# Patient Record
Sex: FEMALE | Race: WHITE | Employment: OTHER | URBAN - METROPOLITAN AREA
[De-identification: names, ages, dates, MRNs, and addresses within clinical notes are randomized per-mention and may not be internally consistent; named-entity substitution may affect disease eponyms.]

---

## 2024-08-27 RX ORDER — HYDROCODONE BITARTRATE AND ACETAMINOPHEN 7.5; 325 MG/1; MG/1
1 TABLET ORAL 3 TIMES DAILY
COMMUNITY
End: 2024-09-13

## 2024-08-27 RX ORDER — FLUTICASONE PROPIONATE 50 MCG
SPRAY, SUSPENSION (ML) NASAL DAILY
COMMUNITY

## 2024-08-27 RX ORDER — TOLTERODINE TARTRATE 2 MG/1
2 TABLET, EXTENDED RELEASE ORAL 2 TIMES DAILY
COMMUNITY

## 2024-08-27 RX ORDER — DOCUSATE SODIUM 100 MG/1
100 CAPSULE, LIQUID FILLED ORAL DAILY
COMMUNITY

## 2024-08-27 RX ORDER — PHENOL 1.4 %
600 AEROSOL, SPRAY (ML) MUCOUS MEMBRANE
COMMUNITY

## 2024-08-27 RX ORDER — ORPHENADRINE CITRATE 100 MG/1
TABLET, EXTENDED RELEASE ORAL DAILY
COMMUNITY

## 2024-08-27 RX ORDER — MIRABEGRON 25 MG/1
25 TABLET, FILM COATED, EXTENDED RELEASE ORAL EVERY MORNING
COMMUNITY

## 2024-08-27 RX ORDER — BUMETANIDE 0.5 MG/1
0.5 TABLET ORAL
COMMUNITY

## 2024-08-27 RX ORDER — VALSARTAN 160 MG/1
160 TABLET ORAL EVERY MORNING
COMMUNITY

## 2024-08-27 RX ORDER — ALLOPURINOL 300 MG/1
300 TABLET ORAL
COMMUNITY

## 2024-08-27 RX ORDER — METOLAZONE 2.5 MG/1
2.5 TABLET ORAL DAILY
COMMUNITY

## 2024-08-27 RX ORDER — POTASSIUM CHLORIDE 750 MG/1
10 TABLET, EXTENDED RELEASE ORAL DAILY
COMMUNITY
End: 2024-09-13

## 2024-08-27 RX ORDER — MAGNESIUM OXIDE 400 MG (241.3 MG MAGNESIUM) TABLET
TABLET
COMMUNITY

## 2024-08-27 RX ORDER — ASCORBIC ACID 500 MG
500 TABLET ORAL EVERY MORNING
COMMUNITY

## 2024-08-27 RX ORDER — UBIDECARENONE 75 MG
CAPSULE ORAL EVERY MORNING
COMMUNITY
End: 2024-09-13

## 2024-08-27 RX ORDER — CHOLECALCIFEROL (VITAMIN D3) 50 MCG
2000 TABLET ORAL EVERY MORNING
COMMUNITY

## 2024-08-27 RX ORDER — TIRZEPATIDE 2.5 MG/.5ML
INJECTION, SOLUTION SUBCUTANEOUS DAILY
COMMUNITY

## 2024-08-27 RX ORDER — MULTIVIT-MIN/IRON FUM/FOLIC AC 7.5 MG-4
1 TABLET ORAL DAILY PRN
COMMUNITY

## 2024-08-27 RX ORDER — ANTIOX #8/OM3/DHA/EPA/LUT/ZEAX 250-2.5 MG
CAPSULE ORAL 2 TIMES DAILY
COMMUNITY
End: 2024-09-13

## 2024-08-27 RX ORDER — FAMOTIDINE 40 MG/1
40 TABLET, FILM COATED ORAL EVERY MORNING
COMMUNITY

## 2024-09-09 ENCOUNTER — ANESTHESIA (OUTPATIENT)
Dept: SURGERY | Facility: HOSPITAL | Age: 69
End: 2024-09-09
Payer: MEDICARE

## 2024-09-09 ENCOUNTER — APPOINTMENT (OUTPATIENT)
Dept: GENERAL RADIOLOGY | Facility: HOSPITAL | Age: 69
End: 2024-09-09
Attending: STUDENT IN AN ORGANIZED HEALTH CARE EDUCATION/TRAINING PROGRAM
Payer: MEDICARE

## 2024-09-09 ENCOUNTER — ANESTHESIA EVENT (OUTPATIENT)
Dept: SURGERY | Facility: HOSPITAL | Age: 69
End: 2024-09-09
Payer: MEDICARE

## 2024-09-09 ENCOUNTER — HOSPITAL ENCOUNTER (INPATIENT)
Facility: HOSPITAL | Age: 69
LOS: 4 days | Discharge: NURSING, REHAB OR LONG-TERM CARE | End: 2024-09-13
Attending: STUDENT IN AN ORGANIZED HEALTH CARE EDUCATION/TRAINING PROGRAM | Admitting: STUDENT IN AN ORGANIZED HEALTH CARE EDUCATION/TRAINING PROGRAM
Payer: MEDICARE

## 2024-09-09 DIAGNOSIS — M16.12 PRIMARY OSTEOARTHRITIS OF LEFT HIP: ICD-10-CM

## 2024-09-09 DIAGNOSIS — Z01.818 PREOP TESTING: Primary | ICD-10-CM

## 2024-09-09 PROBLEM — I10 ESSENTIAL HYPERTENSION: Status: ACTIVE | Noted: 2024-09-09

## 2024-09-09 PROBLEM — M10.9 GOUT: Status: ACTIVE | Noted: 2024-09-09

## 2024-09-09 PROBLEM — G47.33 OSA (OBSTRUCTIVE SLEEP APNEA): Status: ACTIVE | Noted: 2024-09-09

## 2024-09-09 LAB
ANTIBODY SCREEN: NEGATIVE
DEPRECATED RDW RBC AUTO: 43.7 FL (ref 35.1–46.3)
ERYTHROCYTE [DISTWIDTH] IN BLOOD BY AUTOMATED COUNT: 13.3 % (ref 11–15)
GLUCOSE BLDC GLUCOMTR-MCNC: 120 MG/DL (ref 70–99)
GLUCOSE BLDC GLUCOMTR-MCNC: 164 MG/DL (ref 70–99)
GLUCOSE BLDC GLUCOMTR-MCNC: 173 MG/DL (ref 70–99)
HCT VFR BLD AUTO: 35.7 %
HGB BLD-MCNC: 12.1 G/DL
MCH RBC QN AUTO: 30.1 PG (ref 26–34)
MCHC RBC AUTO-ENTMCNC: 33.9 G/DL (ref 31–37)
MCV RBC AUTO: 88.8 FL
PLATELET # BLD AUTO: 133 10(3)UL (ref 150–450)
PLATELETS.RETICULATED NFR BLD AUTO: 6.9 % (ref 0–7)
RBC # BLD AUTO: 4.02 X10(6)UL
RH BLOOD TYPE: POSITIVE
RH BLOOD TYPE: POSITIVE
WBC # BLD AUTO: 7.6 X10(3) UL (ref 4–11)

## 2024-09-09 PROCEDURE — 73501 X-RAY EXAM HIP UNI 1 VIEW: CPT | Performed by: STUDENT IN AN ORGANIZED HEALTH CARE EDUCATION/TRAINING PROGRAM

## 2024-09-09 PROCEDURE — 99223 1ST HOSP IP/OBS HIGH 75: CPT | Performed by: HOSPITALIST

## 2024-09-09 PROCEDURE — 0SRB0JA REPLACEMENT OF LEFT HIP JOINT WITH SYNTHETIC SUBSTITUTE, UNCEMENTED, OPEN APPROACH: ICD-10-PCS | Performed by: STUDENT IN AN ORGANIZED HEALTH CARE EDUCATION/TRAINING PROGRAM

## 2024-09-09 PROCEDURE — 73502 X-RAY EXAM HIP UNI 2-3 VIEWS: CPT | Performed by: STUDENT IN AN ORGANIZED HEALTH CARE EDUCATION/TRAINING PROGRAM

## 2024-09-09 DEVICE — IMPLANTABLE DEVICE
Type: IMPLANTABLE DEVICE | Site: HIP | Status: FUNCTIONAL
Brand: G7® VIVACIT-E®

## 2024-09-09 DEVICE — IMPLANTABLE DEVICE
Type: IMPLANTABLE DEVICE | Site: HIP | Status: FUNCTIONAL
Brand: G7® ACETABULAR SYSTEM

## 2024-09-09 DEVICE — IMPLANTABLE DEVICE: Type: IMPLANTABLE DEVICE | Site: HIP | Status: FUNCTIONAL

## 2024-09-09 DEVICE — IMPLANTABLE DEVICE
Type: IMPLANTABLE DEVICE | Site: HIP | Status: FUNCTIONAL
Brand: WAGNER CONE PROSTHESIS®

## 2024-09-09 DEVICE — BIOLOX® DELTA, CERAMIC FEMORAL HEAD, M, Ø 40/0, TAPER 12/14
Type: IMPLANTABLE DEVICE | Site: HIP | Status: FUNCTIONAL
Brand: BIOLOX® DELTA

## 2024-09-09 RX ORDER — SODIUM CHLORIDE, SODIUM LACTATE, POTASSIUM CHLORIDE, CALCIUM CHLORIDE 600; 310; 30; 20 MG/100ML; MG/100ML; MG/100ML; MG/100ML
INJECTION, SOLUTION INTRAVENOUS CONTINUOUS
Status: DISCONTINUED | OUTPATIENT
Start: 2024-09-09 | End: 2024-09-09 | Stop reason: HOSPADM

## 2024-09-09 RX ORDER — LIDOCAINE HYDROCHLORIDE 10 MG/ML
INJECTION, SOLUTION EPIDURAL; INFILTRATION; INTRACAUDAL; PERINEURAL AS NEEDED
Status: DISCONTINUED | OUTPATIENT
Start: 2024-09-09 | End: 2024-09-09 | Stop reason: SURG

## 2024-09-09 RX ORDER — MORPHINE SULFATE 10 MG/ML
6 INJECTION, SOLUTION INTRAMUSCULAR; INTRAVENOUS EVERY 10 MIN PRN
Status: DISCONTINUED | OUTPATIENT
Start: 2024-09-09 | End: 2024-09-09 | Stop reason: HOSPADM

## 2024-09-09 RX ORDER — DOCUSATE SODIUM 100 MG/1
100 CAPSULE, LIQUID FILLED ORAL DAILY
Status: DISCONTINUED | OUTPATIENT
Start: 2024-09-09 | End: 2024-09-09

## 2024-09-09 RX ORDER — DIPHENHYDRAMINE HYDROCHLORIDE 50 MG/ML
25 INJECTION INTRAMUSCULAR; INTRAVENOUS ONCE AS NEEDED
Status: ACTIVE | OUTPATIENT
Start: 2024-09-09 | End: 2024-09-09

## 2024-09-09 RX ORDER — NALOXONE HYDROCHLORIDE 0.4 MG/ML
80 INJECTION, SOLUTION INTRAMUSCULAR; INTRAVENOUS; SUBCUTANEOUS AS NEEDED
Status: DISCONTINUED | OUTPATIENT
Start: 2024-09-09 | End: 2024-09-09 | Stop reason: HOSPADM

## 2024-09-09 RX ORDER — TRANEXAMIC ACID 10 MG/ML
INJECTION, SOLUTION INTRAVENOUS AS NEEDED
Status: DISCONTINUED | OUTPATIENT
Start: 2024-09-09 | End: 2024-09-09 | Stop reason: SURG

## 2024-09-09 RX ORDER — BISACODYL 10 MG
10 SUPPOSITORY, RECTAL RECTAL
Status: DISCONTINUED | OUTPATIENT
Start: 2024-09-09 | End: 2024-09-13

## 2024-09-09 RX ORDER — POLYETHYLENE GLYCOL 3350 17 G/17G
17 POWDER, FOR SOLUTION ORAL DAILY PRN
Status: DISCONTINUED | OUTPATIENT
Start: 2024-09-09 | End: 2024-09-13

## 2024-09-09 RX ORDER — MORPHINE SULFATE 4 MG/ML
2 INJECTION, SOLUTION INTRAMUSCULAR; INTRAVENOUS EVERY 10 MIN PRN
Status: DISCONTINUED | OUTPATIENT
Start: 2024-09-09 | End: 2024-09-09 | Stop reason: HOSPADM

## 2024-09-09 RX ORDER — NICOTINE POLACRILEX 4 MG
30 LOZENGE BUCCAL
Status: DISCONTINUED | OUTPATIENT
Start: 2024-09-09 | End: 2024-09-09 | Stop reason: HOSPADM

## 2024-09-09 RX ORDER — ROCURONIUM BROMIDE 10 MG/ML
INJECTION, SOLUTION INTRAVENOUS AS NEEDED
Status: DISCONTINUED | OUTPATIENT
Start: 2024-09-09 | End: 2024-09-09 | Stop reason: SURG

## 2024-09-09 RX ORDER — BUMETANIDE 0.5 MG/1
0.5 TABLET ORAL
Status: DISCONTINUED | OUTPATIENT
Start: 2024-09-10 | End: 2024-09-13

## 2024-09-09 RX ORDER — METOCLOPRAMIDE HYDROCHLORIDE 5 MG/ML
10 INJECTION INTRAMUSCULAR; INTRAVENOUS EVERY 8 HOURS PRN
Status: DISCONTINUED | OUTPATIENT
Start: 2024-09-09 | End: 2024-09-09 | Stop reason: HOSPADM

## 2024-09-09 RX ORDER — DEXTROSE MONOHYDRATE 25 G/50ML
50 INJECTION, SOLUTION INTRAVENOUS
Status: DISCONTINUED | OUTPATIENT
Start: 2024-09-09 | End: 2024-09-09 | Stop reason: HOSPADM

## 2024-09-09 RX ORDER — SODIUM CHLORIDE, SODIUM LACTATE, POTASSIUM CHLORIDE, CALCIUM CHLORIDE 600; 310; 30; 20 MG/100ML; MG/100ML; MG/100ML; MG/100ML
INJECTION, SOLUTION INTRAVENOUS CONTINUOUS
Status: DISCONTINUED | OUTPATIENT
Start: 2024-09-09 | End: 2024-09-13

## 2024-09-09 RX ORDER — KETAMINE HYDROCHLORIDE 50 MG/ML
INJECTION, SOLUTION INTRAMUSCULAR; INTRAVENOUS AS NEEDED
Status: DISCONTINUED | OUTPATIENT
Start: 2024-09-09 | End: 2024-09-09 | Stop reason: SURG

## 2024-09-09 RX ORDER — FAMOTIDINE 10 MG/ML
20 INJECTION, SOLUTION INTRAVENOUS ONCE
Status: DISCONTINUED | OUTPATIENT
Start: 2024-09-09 | End: 2024-09-09 | Stop reason: HOSPADM

## 2024-09-09 RX ORDER — DOCUSATE SODIUM 100 MG/1
100 CAPSULE, LIQUID FILLED ORAL 2 TIMES DAILY
Status: DISCONTINUED | OUTPATIENT
Start: 2024-09-09 | End: 2024-09-13

## 2024-09-09 RX ORDER — NICOTINE POLACRILEX 4 MG
15 LOZENGE BUCCAL
Status: DISCONTINUED | OUTPATIENT
Start: 2024-09-09 | End: 2024-09-09 | Stop reason: HOSPADM

## 2024-09-09 RX ORDER — ALLOPURINOL 300 MG/1
300 TABLET ORAL
Status: DISCONTINUED | OUTPATIENT
Start: 2024-09-10 | End: 2024-09-13

## 2024-09-09 RX ORDER — ONDANSETRON 2 MG/ML
4 INJECTION INTRAMUSCULAR; INTRAVENOUS EVERY 6 HOURS PRN
Status: DISCONTINUED | OUTPATIENT
Start: 2024-09-09 | End: 2024-09-09 | Stop reason: HOSPADM

## 2024-09-09 RX ORDER — OXYCODONE HYDROCHLORIDE 5 MG/1
5 TABLET ORAL EVERY 4 HOURS PRN
Status: DISCONTINUED | OUTPATIENT
Start: 2024-09-09 | End: 2024-09-13

## 2024-09-09 RX ORDER — ONDANSETRON 2 MG/ML
4 INJECTION INTRAMUSCULAR; INTRAVENOUS EVERY 6 HOURS PRN
Status: DISCONTINUED | OUTPATIENT
Start: 2024-09-09 | End: 2024-09-13

## 2024-09-09 RX ORDER — DIPHENHYDRAMINE HYDROCHLORIDE 50 MG/ML
12.5 INJECTION INTRAMUSCULAR; INTRAVENOUS EVERY 4 HOURS PRN
Status: DISCONTINUED | OUTPATIENT
Start: 2024-09-09 | End: 2024-09-13

## 2024-09-09 RX ORDER — FLUTICASONE PROPIONATE 50 MCG
1 SPRAY, SUSPENSION (ML) NASAL DAILY
Status: DISCONTINUED | OUTPATIENT
Start: 2024-09-09 | End: 2024-09-13

## 2024-09-09 RX ORDER — FAMOTIDINE 20 MG/1
40 TABLET, FILM COATED ORAL EVERY MORNING
Status: DISCONTINUED | OUTPATIENT
Start: 2024-09-10 | End: 2024-09-09

## 2024-09-09 RX ORDER — ASPIRIN 81 MG/1
81 TABLET ORAL 2 TIMES DAILY
Status: DISCONTINUED | OUTPATIENT
Start: 2024-09-09 | End: 2024-09-13

## 2024-09-09 RX ORDER — SENNOSIDES 8.6 MG
17.2 TABLET ORAL NIGHTLY
Status: DISCONTINUED | OUTPATIENT
Start: 2024-09-09 | End: 2024-09-13

## 2024-09-09 RX ORDER — DIPHENHYDRAMINE HCL 25 MG
25 CAPSULE ORAL EVERY 4 HOURS PRN
Status: DISCONTINUED | OUTPATIENT
Start: 2024-09-09 | End: 2024-09-13

## 2024-09-09 RX ORDER — ACETAMINOPHEN 500 MG
1000 TABLET ORAL ONCE
Status: DISCONTINUED | OUTPATIENT
Start: 2024-09-09 | End: 2024-09-09 | Stop reason: HOSPADM

## 2024-09-09 RX ORDER — LOSARTAN POTASSIUM 100 MG/1
100 TABLET ORAL DAILY
Status: DISCONTINUED | OUTPATIENT
Start: 2024-09-10 | End: 2024-09-10

## 2024-09-09 RX ORDER — MIRABEGRON 50 MG/1
50 TABLET, EXTENDED RELEASE ORAL EVERY MORNING
Status: DISCONTINUED | OUTPATIENT
Start: 2024-09-10 | End: 2024-09-10 | Stop reason: SDUPTHER

## 2024-09-09 RX ORDER — ONDANSETRON 2 MG/ML
INJECTION INTRAMUSCULAR; INTRAVENOUS AS NEEDED
Status: DISCONTINUED | OUTPATIENT
Start: 2024-09-09 | End: 2024-09-09 | Stop reason: SURG

## 2024-09-09 RX ORDER — CYCLOBENZAPRINE HCL 10 MG
10 TABLET ORAL 3 TIMES DAILY PRN
Status: DISCONTINUED | OUTPATIENT
Start: 2024-09-09 | End: 2024-09-13

## 2024-09-09 RX ORDER — ACETAMINOPHEN 500 MG
1000 TABLET ORAL ONCE
Status: DISCONTINUED | OUTPATIENT
Start: 2024-09-09 | End: 2024-09-09

## 2024-09-09 RX ORDER — MORPHINE SULFATE 4 MG/ML
4 INJECTION, SOLUTION INTRAMUSCULAR; INTRAVENOUS EVERY 10 MIN PRN
Status: DISCONTINUED | OUTPATIENT
Start: 2024-09-09 | End: 2024-09-09 | Stop reason: HOSPADM

## 2024-09-09 RX ORDER — FAMOTIDINE 10 MG/ML
20 INJECTION, SOLUTION INTRAVENOUS 2 TIMES DAILY
Status: DISCONTINUED | OUTPATIENT
Start: 2024-09-09 | End: 2024-09-13

## 2024-09-09 RX ORDER — METOCLOPRAMIDE HYDROCHLORIDE 5 MG/ML
10 INJECTION INTRAMUSCULAR; INTRAVENOUS EVERY 8 HOURS PRN
Status: DISCONTINUED | OUTPATIENT
Start: 2024-09-09 | End: 2024-09-13

## 2024-09-09 RX ORDER — TRAMADOL HYDROCHLORIDE 50 MG/1
50 TABLET ORAL EVERY 6 HOURS SCHEDULED
Status: DISCONTINUED | OUTPATIENT
Start: 2024-09-10 | End: 2024-09-13

## 2024-09-09 RX ORDER — SODIUM PHOSPHATE, DIBASIC AND SODIUM PHOSPHATE, MONOBASIC 7; 19 G/230ML; G/230ML
1 ENEMA RECTAL ONCE AS NEEDED
Status: DISCONTINUED | OUTPATIENT
Start: 2024-09-09 | End: 2024-09-13

## 2024-09-09 RX ORDER — HYDROMORPHONE HYDROCHLORIDE 1 MG/ML
0.2 INJECTION, SOLUTION INTRAMUSCULAR; INTRAVENOUS; SUBCUTANEOUS EVERY 5 MIN PRN
Status: DISCONTINUED | OUTPATIENT
Start: 2024-09-09 | End: 2024-09-09 | Stop reason: HOSPADM

## 2024-09-09 RX ORDER — HYDROMORPHONE HYDROCHLORIDE 1 MG/ML
0.4 INJECTION, SOLUTION INTRAMUSCULAR; INTRAVENOUS; SUBCUTANEOUS EVERY 5 MIN PRN
Status: DISCONTINUED | OUTPATIENT
Start: 2024-09-09 | End: 2024-09-09 | Stop reason: HOSPADM

## 2024-09-09 RX ORDER — METOCLOPRAMIDE HYDROCHLORIDE 5 MG/ML
10 INJECTION INTRAMUSCULAR; INTRAVENOUS ONCE
Status: DISCONTINUED | OUTPATIENT
Start: 2024-09-09 | End: 2024-09-09 | Stop reason: HOSPADM

## 2024-09-09 RX ORDER — DEXAMETHASONE SODIUM PHOSPHATE 4 MG/ML
VIAL (ML) INJECTION AS NEEDED
Status: DISCONTINUED | OUTPATIENT
Start: 2024-09-09 | End: 2024-09-09 | Stop reason: SURG

## 2024-09-09 RX ORDER — OXYBUTYNIN CHLORIDE 5 MG/1
5 TABLET ORAL 2 TIMES DAILY
Status: DISCONTINUED | OUTPATIENT
Start: 2024-09-09 | End: 2024-09-10

## 2024-09-09 RX ORDER — ACETAMINOPHEN 10 MG/ML
1000 INJECTION, SOLUTION INTRAVENOUS ONCE
Status: COMPLETED | OUTPATIENT
Start: 2024-09-09 | End: 2024-09-09

## 2024-09-09 RX ORDER — HYDROMORPHONE HYDROCHLORIDE 1 MG/ML
0.6 INJECTION, SOLUTION INTRAMUSCULAR; INTRAVENOUS; SUBCUTANEOUS EVERY 5 MIN PRN
Status: DISCONTINUED | OUTPATIENT
Start: 2024-09-09 | End: 2024-09-09 | Stop reason: HOSPADM

## 2024-09-09 RX ORDER — ACETAMINOPHEN 500 MG
1000 TABLET ORAL EVERY 6 HOURS
Status: DISCONTINUED | OUTPATIENT
Start: 2024-09-09 | End: 2024-09-13

## 2024-09-09 RX ORDER — FAMOTIDINE 20 MG/1
20 TABLET, FILM COATED ORAL ONCE
Status: DISCONTINUED | OUTPATIENT
Start: 2024-09-09 | End: 2024-09-09 | Stop reason: HOSPADM

## 2024-09-09 RX ORDER — METOCLOPRAMIDE 10 MG/1
10 TABLET ORAL ONCE
Status: DISCONTINUED | OUTPATIENT
Start: 2024-09-09 | End: 2024-09-09 | Stop reason: HOSPADM

## 2024-09-09 RX ORDER — FAMOTIDINE 20 MG/1
20 TABLET, FILM COATED ORAL 2 TIMES DAILY
Status: DISCONTINUED | OUTPATIENT
Start: 2024-09-09 | End: 2024-09-13

## 2024-09-09 RX ORDER — SODIUM CHLORIDE 9 MG/ML
INJECTION, SOLUTION INTRAVENOUS CONTINUOUS
Status: DISCONTINUED | OUTPATIENT
Start: 2024-09-09 | End: 2024-09-13

## 2024-09-09 RX ADMIN — KETAMINE HYDROCHLORIDE 20 MG: 50 INJECTION, SOLUTION INTRAMUSCULAR; INTRAVENOUS at 14:11:00

## 2024-09-09 RX ADMIN — ROCURONIUM BROMIDE 5 MG: 10 INJECTION, SOLUTION INTRAVENOUS at 14:11:00

## 2024-09-09 RX ADMIN — DEXAMETHASONE SODIUM PHOSPHATE 4 MG: 4 MG/ML VIAL (ML) INJECTION at 14:11:00

## 2024-09-09 RX ADMIN — KETAMINE HYDROCHLORIDE 10 MG: 50 INJECTION, SOLUTION INTRAMUSCULAR; INTRAVENOUS at 16:28:00

## 2024-09-09 RX ADMIN — SODIUM CHLORIDE, SODIUM LACTATE, POTASSIUM CHLORIDE, CALCIUM CHLORIDE: 600; 310; 30; 20 INJECTION, SOLUTION INTRAVENOUS at 16:34:00

## 2024-09-09 RX ADMIN — LIDOCAINE HYDROCHLORIDE 50 MG: 10 INJECTION, SOLUTION EPIDURAL; INFILTRATION; INTRACAUDAL; PERINEURAL at 14:11:00

## 2024-09-09 RX ADMIN — ONDANSETRON 4 MG: 2 INJECTION INTRAMUSCULAR; INTRAVENOUS at 14:11:00

## 2024-09-09 RX ADMIN — KETAMINE HYDROCHLORIDE 10 MG: 50 INJECTION, SOLUTION INTRAMUSCULAR; INTRAVENOUS at 16:00:00

## 2024-09-09 RX ADMIN — TRANEXAMIC ACID 1000 MG: 10 INJECTION, SOLUTION INTRAVENOUS at 14:20:00

## 2024-09-09 RX ADMIN — ROCURONIUM BROMIDE 45 MG: 10 INJECTION, SOLUTION INTRAVENOUS at 14:15:00

## 2024-09-09 RX ADMIN — KETAMINE HYDROCHLORIDE 10 MG: 50 INJECTION, SOLUTION INTRAMUSCULAR; INTRAVENOUS at 15:17:00

## 2024-09-09 NOTE — CONSULTS
[facility]    PATIENT'S NAME: CAROL VARELA   ATTENDING PHYSICIAN: Omar Behery, MD   CONSULTING PHYSICIAN: Arthur Tirado MD   PATIENT ACCOUNT#:   [id number]    LOCATION:  [facility] PACU 10 [facility] 10  MEDICAL RECORD #:   [id number]       YOB: 1955  ADMISSION DATE:       09/09/2024      CONSULT DATE:  09/09/2024    REPORT OF CONSULTATION      REASON FOR ADMISSION:  Post left total hip arthroplasty.    HISTORY OF PRESENT ILLNESS:  Patient is a 69-year-old  female with chronic left hip and underlying severe primary osteoarthritis, failed outpatient conservative medical management options.  Scheduled today for above-mentioned procedure by her orthopedic surgeon, Dr. Omar Behery.  Postoperatively, transferred to PACU for further monitoring.      PAST MEDICAL HISTORY:  Generalized osteoarthritis, gout, hypertension, fibromyalgia, obstructive sleep apnea, obesity, anxiety, gastroesophageal reflux disease, history of DVT.    PAST SURGICAL HISTORY:  D and C procedure, umbilical hernia repair, panniculectomy, right total knee arthroplasty, bilateral carpal tunnel release, hysterectomy, right lower extremity tumor resection, and lumbar spinal fusion per patient's report.    ALLERGIES:  Penicillin and sulfa.  Multiple medication side effects.    SOCIAL HISTORY:  Ex-tobacco user.  No current tobacco, alcohol, or drug use.  Limited mobility.  Usually independent for basic activities of daily living.     FAMILY HISTORY:  Positive for heart disease.    REVIEW OF SYSTEMS:  Currently resting in bed, complaining of lower back pain and left hip pain.  No chest pain.  No shortness of breath.  Other 12-point review of systems is negative.       PHYSICAL EXAMINATION:    GENERAL:  Alert and oriented to time, place and person.  Moderate distress.   VITAL SIGNS:  Temperature 98.3, pulse 92, respiratory rate 10, blood pressure 176/90, pulse ox 98% on 4L nasal cannula oxygen.  HEENT:  Atraumatic.   Oropharynx clear.  Moist mucous membranes.  Ears and nose normal.  Eyes:  Anicteric sclerae.    NECK:  Supple.  No lymphadenopathy.  Trachea midline.  Full range of motion.   LUNGS:  Clear to auscultation bilaterally.  Normal respiratory effort.    HEART:  Regular rate and rhythm.  S1 and S2 auscultated.    ABDOMEN:  Soft, nondistended.  Positive bowel sounds.   EXTREMITIES:  Right posterior hip Prevena wound VAC dressing.  Lymphedema, both legs.  No clubbing or cyanosis.   NEUROLOGIC:  Motor and sensory intact.    ASSESSMENT AND PLAN:    1.   Primary left hip osteoarthritis, status post left total hip arthroplasty, posterior approach.  Pain control.  Neurovascular checks.  Aspirin for DVT prophylaxis.  Physical and occupational therapy.  2.   Essential hypertension.  Continue home medications and monitor.  3.   Bilateral lower extremity lymphedema.  Continue home dose and diuretics.  4.   Obstructive sleep apnea.  Apply obstructive sleep apnea protocol and monitor.  5.   Gout.  Continue home medications.  6.   Chronic back pain.  We will place her on cyclobenzaprine since her home muscle relaxer, orphenadrine, is not on formulary.      Further recommendations to follow.     Dictated By Arthur Tirado MD  d: 09/09/2024 17:48:44  t: 09/09/2024 18:21:00  Job [id number]  FB/    cc: Omar Behery, MD

## 2024-09-09 NOTE — OPERATIVE REPORT
[facility]  OPERATIVE REPORT     DATE OF SURGERY: 9/9/2024     PREOPERATIVE DIAGNOSIS:   Left hip acetabular dysplasia, proximal femoral deformity and degenerative joint disease  Left hip joint ankylosis     POST-OPERATIVE DIAGNOSIS:   Left hip acetabular dysplasia, proximal femoral deformity and degenerative joint disease  Left hip joint ankylosis     PROCEDURE:  Left Complex Primary Total Hip Arthroplasty - Modifier 22  Prophylactic Fixation Femoral shaft (Left)  Application of negative pressure dressing     Location: [facility]     SURGEON: Omar A Behery, MD  Assistant(s): Jp Arreaga MD, Bridget Pardo CSA     Anesthesia type: General  Estimated Blood Loss: 400cc     Drains: Prevena plus incisional WV     Complications: None immediately apparent     Implants:  Acetabular Component: ZB G7 size 60mm shell, Neutral 40mm vitamin E, highly crosslinked liner, 5x 6.5mm screws  Femoral Component: Press-fit size 18, high offset, ZB Maddox cone femoral component  Head: 40mm, 0 delta ceramic head     Indication:      This is a 69 year old man,with adolescent hip deformity, who went on to develop severe hip osteoarthritis in the setting of acetabular dysplasia and proximal femoral deformity with ankylosis of the hip joint, who presented with chronic left hip and back pain refractory to non-operative treatment, and impairing activities of daily living including standing, walking or sitting.  Surgical versus non-surgical options were discussed with the patient, and together we decided it is best to proceed with a total hip arthroplasty with the goals of pain relief and improvement in hip range of motion function. All risks and benefits of surgery including bleeding, infection, recurrent dislocation, ac-prosthetic fracture, neurovascular injury, leg length or offset discrepancy, as well as medical and anesthesia-related complications were discussed with the patient / family, who elected to  proceed with surgery. She understood the high risk nature of her surgery, but felt that her risk was necessary given her extremely poor quality of life currently.     Procedure in detail:     The patient was brought to the operating room and placed in the lateral decubitus position on a lateral  positioner.  The extremities were padded appropriately.  Intravenous antibiotics and TXA were administered.  The operative hip was prepped and draped in the usual sterile fashion.     A surgical pause was conducted to identify and verify the appropriate patient, surgical site, surgical site marking, and antibiotic administration.     A curved incision was then made centered over the lateral aspect of the greater trochanter  and carried sharply down to the level of the fascia.  The fascia was split superiorly and inferiorly. Distally, normal anatomy was identified, to include the vastus ridge and vastus lateralis. The gluteus yamile sling was released for exposure. Proximally, the fascia was split. .  A Charnley hip retractor was placed in the wound.      The posterior column and ischium were exposed and the sciatic nerve was was digitally palpable and was protected the entire case.     An in-situ neck was cut was performed on the femoral neck using a reciprocating saw. The head was removed in a piecemeal fashion and the hip dislocated.      The alvino was also used to lateralize an opening on the femur and find the canal. A round alvino was used to create the appropriate entry point into the proximal femur, and remove any lateral bone that would impede neutral stem alignment. A canal finder was advanced into the canal. The femur was then sequentially reamed and a size 18 cone stem trial was noted to achieve appropriate axial and rotational stability.We planned for a low position of the stem given severe leg shortening preop and anticipated difficulty with reduction. A prophylactic femoral mor wire was placed and tightened  below the lesser prior to insertion of the trial later.      The acetabulum was then exposed with retractors. A 55 reamer was used to ream medially and inferior at the true hip center. The pulvinar was uncovered and resected using cautery. We then sequentially reamed to a  59mm diameter at a slightly higher and medial position to allow for <30% undercoverage of the final cup. The 59mm reamer achieved appropriate hemispherical pinch and there was adequate bleeding cancellous bone for ingrowth  A final 60mm G7 multihole cup was impacted in a position of appropriate anteversion and inclination with excellent press fit. Fluoroscopy confirmed appropriate cup position and 4 dome screws were drilled and placed with 1 ischial screw drilled and placed with excellent purchase. A trial liner was placed at this point to verify ability for reduction.      The trial stem was inserted with a 0 head and reduced with appropriate tension. Intra-op portable plain film was used to confirm appropriate stem position and alignment. The trialed head provided adequate posterior and anterior stability despite the reduced offset noted and at the high hip center position. This was at an increased limb length compared to preop but still shorter than contralateral, as planned. We did not aim to fully restore leg lengths as that would have been impossible to reduce.      The trial stem, head and liner were removed and pain cocktail injection was delivered around the pericapsular tissues. The final 40mm was impacted and checked and noted to be well seated.      The femur was exposed once again and the final stem was impacted into place, along with the final 40mm, 0 head which was reduced. Again, ROM was noted to be stable anteriorly and posteriorly.      Excellent hemostasis was achieved. The wound was lavaged with dilute betadine/peroxide solution, followed by normal saline pulse irrigation. Additional pain cocktail injection was delivered to the  soft tissues.      Hip flexion range of motion of approximately 80 degrees of flexion, 70 degrees of internal rotation without dislocation, and 50 degrees of external rotation was achieved.     The fascia was closed using #1 vicryl interrupted suture and #2 Quill. Subcutaneous tissue was closed using 0-vicryl, 2-0 Vicryl and the skin was closed with 3-0 nylon. A sterile negative pressure dressing was placed.      There was no qualified resident available to assist because qualified residents were assisting with other surgery. The fellow assistant was necessary for help with positioning, draping, retraction, and wound closure.      Modifier 22 was billed for in this case given severe ankylosis, proximal femoral deformity, and acetabular dysplasia, all complex features increasing the amount of time for exposure, instrumentation, and component implantation.      POST-OPERATIVE PLAN  The patient will be permitted WBAT LLE  The patient will be placed on posterior hip precautions for 6 weeks.   The patient will receive 24 hours of perioperative antibiotics followed by 1 week of extended oral prophylaxis  Venous thromboembolic prophylaxis will consist of early mobilization, mechanical compressive devices, and ASA 81 daily.     The patient should be scheduled for follow up in 2 weeks for wound and radiographic evaluation.

## 2024-09-09 NOTE — ANESTHESIA PREPROCEDURE EVALUATION
Anesthesia PreOp Note    HPI:     Maria Elena Moore is a 69 year old female who presents for preoperative consultation requested by: Behery, Omar Atef, MD    Date of Surgery: 9/9/2024    Procedure(s):  Left complex posterior primary total hip arthroplasty  Indication: Left Hip Osteoarthritis    Relevant Problems   No relevant active problems       NPO:  Last Liquid Consumption Date: 09/09/24  Last Liquid Consumption Time: 0730  Last Solid Consumption Date: 09/08/24  Last Solid Consumption Time: 2100  Last Liquid Consumption Date: 09/09/24          History Review:  There are no problems to display for this patient.      Past Medical History:    Diabetes (HCC)    Gout    High blood pressure    Osteoarthritis    PONV (postoperative nausea and vomiting)    Renal disorder    Scoliosis    Spinal stenosis    Visual impairment    glasses       Past Surgical History:   Procedure Laterality Date    Dilation/curettage,diagnostic      1983.1999    Hernia surgery      Hysterectomy  05/2016    Other surgical history      penaculectomy    Other surgical history  2018    tumor removed  right leg    Other surgical history  2012    partial right knee lount    Other surgical history  2002    right total knee replacement    Other surgical history  1999    right wrist carpal tunnel    Other surgical history  2001    Left carpal tunnel       Medications Prior to Admission   Medication Sig Dispense Refill Last Dose    valsartan 160 MG Oral Tab Take 1 tablet (160 mg total) by mouth every morning.   9/8/2024 at 1000    Mirabegron ER (MYRBETRIQ) 50 MG Oral Tablet 24 Hr Take 125 mg by mouth every morning.   9/8/2024 at 1000    bumetanide 0.5 MG Oral Tab Take 1 tablet (0.5 mg total) by mouth BID (Diuretic).   9/8/2024 at 1200    allopurinol 300 MG Oral Tab Take 1 tablet (300 mg total) by mouth twice a week. On Tuesday and thurday 9/5/2024    famotidine 40 MG Oral Tab Take 1 tablet (40 mg total) by mouth every morning.   9/9/2024 at 0940     orphenadrine  MG Oral Tablet 12 Hr Take by mouth daily.   8/21/2024    Coenzyme Q10 (CO Q-10) 200 MG Oral Cap Take by mouth every morning.   9/8/2024 at 1000    cholecalciferol 50 MCG (2000 UT) Oral Tab Take 1 tablet (2,000 Units total) by mouth every morning.   9/8/2024 at 0900    Calcium Carbonate 600 MG Oral Tab Take 1 tablet (600 mg total) by mouth twice a week. On Tuesday/Thursday   Past Week    Vitamin C 500 MG Oral Tab Take 1 tablet (500 mg total) by mouth every morning.   9/8/2024 at 1000    docusate sodium 100 MG Oral Cap Take 1 capsule (100 mg total) by mouth daily.   9/8/2024 at 1000    metOLazone 2.5 MG Oral Tab Take 1 tablet (2.5 mg total) by mouth daily. On Monday and Friday 9/5/2024    Tirzepatide (MOUNJARO) 2.5 MG/0.5ML Subcutaneous Solution Pen-injector Inject into the skin daily. She take it on Wednesday 8/14/2024    ETODOLAC OR Take 50 mg by mouth daily.   8/21/2024    Multiple Vitamins-Minerals (PRESERVISION AREDS 2) Oral Cap Take by mouth 2 (two) times a day.   9/8/2024 at 2100    tolterodine 2 MG Oral Tab Take 1 tablet (2 mg total) by mouth 2 (two) times daily. Take lunch and supper   9/8/2024 at 2100    potassium chloride 10 MEQ Oral Tab CR Take 1 tablet (10 mEq total) by mouth daily. Takes it lunch   9/8/2024 at 1000    HYDROcodone-acetaminophen 7.5-325 MG Oral Tab Take 1 tablet by mouth in the morning, at noon, and at bedtime.   9/9/2024 at 0915    Magnesium Gluconate 500 (27 Mg) MG Oral Tab Take by mouth After dinner.   9/8/2024 at 2110    fluticasone propionate 50 MCG/ACT Nasal Suspension by Each Nare route daily.   9/8/2024 at 1000    Multiple Vitamins-Minerals (MULTI-VITAMIN/MINERALS) Oral Tab Take 1 tablet by mouth daily as needed.   Past Week     Current Facility-Administered Medications Ordered in Epic   Medication Dose Route Frequency Provider Last Rate Last Admin    lactated ringers infusion   Intravenous Continuous Behery, Chan Atef, MD 20 mL/hr at 09/09/24 1128 New Bag  at 09/09/24 1128    acetaminophen (Tylenol Extra Strength) tab 1,000 mg  1,000 mg Oral Once Behery, Omar Atef, MD        famotidine (Pepcid) tab 20 mg  20 mg Oral Once Behery, Omar Atef, MD        Or    famotidine (Pepcid) 20 mg/2mL injection 20 mg  20 mg Intravenous Once Behery, Omar Atef, MD        metoclopramide (Reglan) tab 10 mg  10 mg Oral Once Behery, Omar Atef, MD        Or    metoclopramide (Reglan) 5 mg/mL injection 10 mg  10 mg Intravenous Once Behery, Omar Atef, MD        ceFAZolin (Ancef) 2g in 10mL IV syringe premix  2 g Intravenous Once Behery, Omar Atef, MD        clonidine-EPINEPHrine-ropivacaine-ketorolac (CERTS) (Duraclon-Adrenalin-Naropin-Toradol) pain cocktail irrigation   Intra-articular Once (Intra-Op) Behery, Omar Atef, MD         No current [facility]-ordered outpatient medications on file.       Allergies   Allergen Reactions    Other OTHER (SEE COMMENTS)     IRIDIUM---infection      Penicillins HIVES     Hives,rash as a child  No peeling/blisters  No organ damage    Salicylates PALPITATIONS     headaches    Sulfa Antibiotics HIVES     Hives.rash,itching    Febuxostat OTHER (SEE COMMENTS)     Increase heart rate  headache    Hydromorphone OTHER (SEE COMMENTS)     Headache  Increase heart rate    Nystatin OTHER (SEE COMMENTS)     Hives not always    Valsartan OTHER (SEE COMMENTS)     Headache  Muscle pain    Verapamil OTHER (SEE COMMENTS)     Increase heart rate    Duloxetine OTHER (SEE COMMENTS)     nausea    Gabapentin OTHER (SEE COMMENTS)     Gain weight    Nickel OTHER (SEE COMMENTS)     Cannot confirm--had titanium on her hip    Pregabalin OTHER (SEE COMMENTS)     nausea       Family History   Problem Relation Age of Onset    Heart Disorder Father     Heart Attack Father      Social History     Socioeconomic History    Marital status:    Tobacco Use    Smoking status: Former     Types: Cigarettes    Smokeless tobacco: Never   Vaping Use    Vaping status: Never Used   Substance and  Sexual Activity    Alcohol use: Never    Drug use: Never       Available pre-op labs reviewed.     Lab Results   Component Value Date    PGLU 120 (H) 09/09/2024          Vital Signs:  Body mass index is 36.96 kg/m².   height is 1.702 m (5' 7\") and weight is 107 kg (236 lb). Her oral temperature is 98.3 °F (36.8 °C). Her blood pressure is 128/63 and her pulse is 82. Her respiration is 20 and oxygen saturation is 100%.   Vitals:    08/27/24 1630 09/09/24 1143   BP:  128/63   Pulse:  82   Resp:  20   Temp:  98.3 °F (36.8 °C)   TempSrc:  Oral   SpO2:  100%   Weight: 107.5 kg (237 lb) 107 kg (236 lb)   Height: 1.702 m (5' 7\")         Anesthesia Evaluation     Patient summary reviewed and Nursing notes reviewed    No history of anesthetic complications   Airway   Mallampati: II  Neck ROM: full  Dental      Pulmonary - negative ROS and normal exam   Cardiovascular - negative ROS and normal exam  (+) hypertension    Neuro/Psych - negative ROS     GI/Hepatic/Renal - negative ROS     Endo/Other - negative ROS   (+) diabetes mellitus  Abdominal  - normal exam  (+) obese     Other findings: Unable to lie flat secondary to back spasms   will proceed with general anesthesia  Induction in preop bed            Anesthesia Plan:   ASA:  3  Plan:   General  Airway:  ETT  Post-op Pain Management: IV analgesics  Informed Consent Plan and Risks Discussed With:  Patient  Discussed plan with:  CRNA      I have informed Maria Elena Moore and/or legal guardian or family member of the nature of the anesthetic plan, benefits, risks including possible dental damage if relevant, major complications, and any alternative forms of anesthetic management.   All of the patient's questions were answered to the best of my ability. The patient desires the anesthetic management as planned.  MARILYNN PHAM MD  9/9/2024 1:10 PM  Present on Admission:  **None**

## 2024-09-09 NOTE — H&P
Pre-op Diagnosis: Right Knee Osteoarthritis       A preoperative H&P was conducted By Arjun Hope MD on 8/12/2024.      The above referenced H&P was reviewed by Reji Carmona MD on 9/9/2024, the patient was examined and no significant changes have occurred in the patient's condition since the H&P was performed.  I discussed with the patient and/or legal representative the potential benefits, risks and side effects of this procedure; the likelihood of the patient achieving goals; and potential problems that might occur during recuperation.  I discussed reasonable alternatives to the procedure, including risks, benefits and side effects related to the alternatives and risks related to not receiving this procedure.  We will proceed with procedure as planned.     Reji Carmona MD  Fellow, Adult Joint Replacement and Reconstruction  [facility]  09/09/24, 12:29

## 2024-09-09 NOTE — ANESTHESIA POSTPROCEDURE EVALUATION
Patient: Maria Elena Moore    Procedure Summary       Date: 09/09/24 Room / Location: [facility] MAIN OR  / [facility] MAIN OR    Anesthesia Start: 1359 Anesthesia Stop:     Procedure: Left complex posterior primary total hip arthroplasty (Left: Hip) Diagnosis:       Primary osteoarthritis of left hip      (Left Hip Osteoarthritis)    Surgeons: Behery, Omar Atef, MD Anesthesiologist: Jp Spears MD    Anesthesia Type: general ASA Status: 3            Anesthesia Type: general    Vitals Value Taken Time   /120 09/09/24 1655   Temp 97 09/09/24 1655   Pulse 101 09/09/24 1655   Resp 18 09/09/24 1655   SpO2 100 09/09/24 1655       [facility] AN Post Evaluation:   Patient Evaluated in PACU  Patient Participation: complete - patient participated  Level of Consciousness: sleepy but conscious  Pain Score: 0  Pain Management: adequate  Airway Patency:patent  Dental exam unchanged from preop  Yes    Nausea/Vomiting: none  Cardiovascular Status: acceptable and hemodynamically stable  Respiratory Status: acceptable and nasal cannula  Postoperative Hydration acceptable      Elisa Rodriguez CRNA  9/9/2024 4:55 PM

## 2024-09-09 NOTE — DISCHARGE INSTRUCTIONS
DISCHARGE INSTRUCTIONS:  PLACE ICE TO SURGICAL AREA 20-30 MINUTES ON/ 20-30 MINUTES OFF. THIS IS TO HELP WITH PAIN & SWELLING.    TAKE YOUR MEDICATIONS AS PRESCRIBED BY YOUR DOCTOR BELOW.THESE HAVE BEEN SENT TO YOUR PHARMACY.    IF YOU WERE INSTRUCTED BY PHYSICAL THERAPY TO EXERCISE HIP PRECAUTIONS, CONTINUE TO DO SO AFTER DISCHARGE    IT IS OK TO BEAR WEIGHT AS TOLERATED ON THE OPERATIVE EXTREMITY.     CALL TO CONFIRM YOUR FOLLOW UP APPOINTMENT WITH DR. BEHERY TO BE SEEN IN 2-3 WEEKS POSTOP. [phone]    MEDICATIONS    POST OP MEDICATION REGIMEN              MULTI-MODAL   PAIN REGIMEN       DRUG   FOR   FREQUENCY & DURATION   QTY   NOTES     WEANING  TIPS                Tylenol 500mg     Mild pain   Take 2 tabs every 6 hours     90   Can purchase over-the-counter    Stop using medication if no longer having pain.      Tramadol 50mg     Moderate pain   Take 1-2 tabs every 6 hours only as needed   45 May prescribe a refill, but ideally, this should be tapered off after surgery Stop using this medication 2nd   As pain decreases over time, increase the interval between doses (6 hours to 8, then 12, then 24) to taper off the medication.                    BREAKTHROUGH PAIN         Oxycodone 5mg       Severe pain     Take 1-2 tabs every 4-6 hours only as needed for severe pain       40   Take at least 1 hr apart from Tramadol.    Ideally, no refill. The goal is to taper off this medication as soon as possible, as it can be addictive and have side effects.    Stop using this medication 1st if no longer having severe pain.         BLOOD THINNER     Aspirin 81mg   Blood clot prevention   Take 1 tab twice daily for 30 days     60     No refills   Complete the entire course of your blood thinner.         ANTIBIOTIC       Cefadroxil 500mg       Infection prevention     Take 1 tab twice daily for 7 days     14     No refills   Complete the entire course of your prophylactic antibiotic.           STOOL SOFTENER     Sennokot  8.6/50mg   Constipation   Take 1 tab twice daily  while on opioids     60 Refer to discharge instructions if constipation persists even after taking this medication   Stop taking if having diarrhea or loose stools.           ANTI-NAUSEA   Ondansetron 4mg   Nausea   Take 1 tab every 8 hours as needed if nauseous     20   No Refill      ANTI-ACID REFLUX / GASTRITIS   Pantoprazole 40mg   Acid reflux, gastric ulcer prophylaxis   Take 1 tab every day along with Meloxicam     60   May refill if Meloxicam refilled        DRESSING:    YOU HAVE A SPECIAL DRESSING CALLED A PREVENA DRESSING, OVER YOUR INCISION. THIS IS A TYPE OF NEGATIVE PRESSURE DRESSING THAT KEEPS THE WOUND DRY. IT IS CONNECTED TO A CANNISTER. MAKE SURE THAT THE CANNISTER IS POWERED ON AND NOT OUT OF BATTERY.     THE DRESSING STAYS FOR 7 DAYS FROM SURGERY. THIS DRESSING SHOULD BE CHANGED TO AN AQUACEL AT 7 DAYS POST-OPERATIVELY. IF THE PREVENA DRESSING HAS CONTINUOUS AND PERSISTENT DRAINAGE, CALL YOUR SURGEON FIRST BEFORE CHANGING THE DRESSING TO AN AQUACEL DRESSING.     YOU MAY SHOWER AS SOON AS THE AQUACEL DRESSING IS PLACED INSTEAD OF THE PREVENA DRESSING OVER YOUR INCISION AREA.    IF THE DRESSING LEAKS OR COMES LOOSE BEFORE THE 7 DAY PERIOD CONTACT YOUR DOCTOR / SURGEON.    AFTER   14 DAYS POST OPERATIVELY, THE AQUACEL DRESSING IS REMOVED BY PULLING ON THE EDGE OF THE DRESSING AND GENTLY STRETCHING IT, THEN PEEL IT OFF SLOWLY LIKE A BANDAID. IF YOU HAVE ANY QUESTIONS OR CONCERNS ABOUT THE DRESSING CONTACT YOUR DOCTOR.     REASONS TO CALL YOUR DOCTOR:  TEMPERATURE .0 OR MORE  PERSISTANT NAUSEA OR VOMITTING - ESPECIALLY IF YOU ARE UNABLE TO EAT OR DRINK.  INCREASED LEVEL OF PAIN OR PAIN WHICH IS NOT CONTROLLED BY YOUR PAIN MEDICINE.  PERSISTENT DRAINAGE AT ANYTIME FROM YOUR SURGICAL AREA / INCISION.  PAIN, TENDERNESS OR SUDDEN SWELLING IN YOUR CALF REGION OF THE LOWER EXTREMITIES - THIS IS A POSSIBLE SIGN OF A BLOOD CLOT.  ANY CHANGES IN SENSATION  IN YOUR BODY IN THE AREA OF YOUR SURGERY = NUMBNESS OR TINGLING.  ANY CHANGES IN CIRCULATION IN YOUR BODY IN THE AREA OF YOUR SURGERY = CHANGES  IN COLOR EITHER DARKER OR VERY PALE; OR  IF AREA FEELS COLD TO THE TOUCH AS COMPARED TO OTHER AREAS.  ANY CHANGES IN FUNCTION IN YOUR BODY IN THE AREA OF YOUR SURGERY = CHANGE IN MOVEMENT - UNABLE TO MOVE OR WIGGLE FINGERS, TOES OR FOOT OR ANY OTHER CHANGES IN NORMAL BODY FUNCTIONING.  FOR ANY OF THE ABOVE OR ANY OTHER QUESTIONS OR CONCERNS CALL YOUR DOCTOR/ SURGEON AS SOON AS POSSIBLE.      Omar Behery, MD MPH  Orthopaedic Surgeon, Adult Hip and Knee Reconstruction  [facility] at [address], [address] [address]  Office: (P) [phone] (F) [phone]  Adminstrative Assistant: Sosa Luna     Dear Patient,     [facility] cares about your progress with recovery following your joint replacement surgery.     300 days from your scheduled surgery, [facility] will send you a follow-up survey to help us understand how your surgery impacted your mobility, pain, and overall quality of life. Please make every effort to complete this survey. The information collected from this survey will be used by your physician to track your recovery.     Sincerely,     [facility]

## 2024-09-09 NOTE — ANESTHESIA PROCEDURE NOTES
Airway  Date/Time: 9/9/2024 2:12 PM  Urgency: Elective    Airway not difficult    General Information and Staff    Patient location during procedure: OR  Resident/CRNA: Elisa Rodriguez CRNA  Performed: CRNA   Performed by: Elisa Rodriguez CRNA  Authorized by: Jp Spears MD      Indications and Patient Condition  Indications for airway management: anesthesia  Spontaneous Ventilation: absent  Sedation level: deep  Preoxygenated: yes  Patient position: sniffing  MILS maintained throughout  Mask difficulty assessment: 0 - not attempted  No planned trial extubation    Final Airway Details  Final airway type: endotracheal airway      Successful airway: ETT  Cuffed: yes   Successful intubation technique: Video laryngoscopy  Facilitating devices/methods: intubating stylet  Endotracheal tube insertion site: oral  Blade type: [place].  Blade size: #3  ETT size (mm): 7.5    Cormack-Lehane Classification: grade I - full view of glottis  Placement verified by: capnometry   Cuff volume (mL): 8  Measured from: teeth  ETT to teeth (cm): 20  Number of attempts at approach: 1  Number of other approaches attempted: 0    Additional Comments  Atraumatic intubation

## 2024-09-10 LAB
ANION GAP SERPL CALC-SCNC: 7 MMOL/L (ref 0–18)
BUN BLD-MCNC: 25 MG/DL (ref 9–23)
BUN/CREAT SERPL: 24 (ref 10–20)
CALCIUM BLD-MCNC: 8.6 MG/DL (ref 8.7–10.4)
CHLORIDE SERPL-SCNC: 107 MMOL/L (ref 98–112)
CO2 SERPL-SCNC: 27 MMOL/L (ref 21–32)
CREAT BLD-MCNC: 1.04 MG/DL
DEPRECATED RDW RBC AUTO: 43.3 FL (ref 35.1–46.3)
EGFRCR SERPLBLD CKD-EPI 2021: 59 ML/MIN/1.73M2 (ref 60–?)
ERYTHROCYTE [DISTWIDTH] IN BLOOD BY AUTOMATED COUNT: 13.4 % (ref 11–15)
EST. AVERAGE GLUCOSE BLD GHB EST-MCNC: 126 MG/DL (ref 68–126)
GLUCOSE BLD-MCNC: 143 MG/DL (ref 70–99)
GLUCOSE BLDC GLUCOMTR-MCNC: 106 MG/DL (ref 70–99)
GLUCOSE BLDC GLUCOMTR-MCNC: 115 MG/DL (ref 70–99)
GLUCOSE BLDC GLUCOMTR-MCNC: 122 MG/DL (ref 70–99)
GLUCOSE BLDC GLUCOMTR-MCNC: 135 MG/DL (ref 70–99)
HBA1C MFR BLD: 6 % (ref ?–5.7)
HCT VFR BLD AUTO: 29 %
HGB BLD-MCNC: 10 G/DL
MCH RBC QN AUTO: 30.4 PG (ref 26–34)
MCHC RBC AUTO-ENTMCNC: 34.5 G/DL (ref 31–37)
MCV RBC AUTO: 88.1 FL
OSMOLALITY SERPL CALC.SUM OF ELEC: 299 MOSM/KG (ref 275–295)
PLATELET # BLD AUTO: 131 10(3)UL (ref 150–450)
POTASSIUM SERPL-SCNC: 3.8 MMOL/L (ref 3.5–5.1)
RBC # BLD AUTO: 3.29 X10(6)UL
SODIUM SERPL-SCNC: 141 MMOL/L (ref 136–145)
WBC # BLD AUTO: 7.8 X10(3) UL (ref 4–11)

## 2024-09-10 PROCEDURE — 99233 SBSQ HOSP IP/OBS HIGH 50: CPT | Performed by: HOSPITALIST

## 2024-09-10 RX ORDER — TOLTERODINE 2 MG/1
2 CAPSULE, EXTENDED RELEASE ORAL 2 TIMES DAILY
Status: DISCONTINUED | OUTPATIENT
Start: 2024-09-10 | End: 2024-09-13

## 2024-09-10 RX ORDER — POTASSIUM CHLORIDE 1500 MG/1
40 TABLET, EXTENDED RELEASE ORAL ONCE
Status: COMPLETED | OUTPATIENT
Start: 2024-09-10 | End: 2024-09-10

## 2024-09-10 RX ORDER — MIRABEGRON 25 MG/1
25 TABLET, FILM COATED, EXTENDED RELEASE ORAL DAILY
Status: DISCONTINUED | OUTPATIENT
Start: 2024-09-10 | End: 2024-09-13

## 2024-09-10 RX ORDER — VALSARTAN 160 MG/1
160 TABLET ORAL DAILY
Status: DISCONTINUED | OUTPATIENT
Start: 2024-09-10 | End: 2024-09-13

## 2024-09-10 RX ORDER — METOLAZONE 2.5 MG/1
2.5 TABLET ORAL
Status: DISCONTINUED | OUTPATIENT
Start: 2024-09-13 | End: 2024-09-13

## 2024-09-10 NOTE — CM/SW NOTE
Per review of Dr. Behery notes, HH is to be set up by Case Management team.    CM requested department  ([facility]) to initiate [facility] referral for HHC. F2F entered. SW/ALEXIA will continue to follow.     Billie Flores RN, BSN  Nurse   [phone]

## 2024-09-10 NOTE — OCCUPATIONAL THERAPY NOTE
OCCUPATIONAL THERAPY EVALUATION - INPATIENT     Room Number: 416/416-A  Evaluation Date: 9/10/2024  Type of Evaluation: Initial  Presenting Problem: LT FREDY    Physician Order: IP Consult to Occupational Therapy  Reason for Therapy: ADL/IADL Dysfunction and Discharge Planning    OCCUPATIONAL THERAPY ASSESSMENT   Patient is a 69 year old female admitted 9/9/2024 for LT FREDY, WBAT, posterior THP.  Prior to admission, patient's baseline is mod I using axillary crutches. Pt lives lone in a one story house, retired, drives.   Patient is currently functioning below baseline.  Patient is requiring maximum assist as a result of the following impairments: decreased functional strength, decreased functional reach, decreased endurance, pain, difficulty maintaining precautions, limited MARGA UE (and LT LE d/t posterior THP) ROM, and resistance to therapist suggested techniques . Occupational Therapy will continue to follow for duration of hospitalization.    Patient will benefit from continued skilled OT Services to promote return to prior level of function and safety with continuous assistance and gradual rehabilitative therapy    PLAN  OT Treatment Plan: ADL training;Functional transfer training;Patient/Family education;Patient/Family training;Equipment eval/education;Compensatory technique education     OCCUPATIONAL THERAPY MEDICAL/SOCIAL HISTORY   Problem List   Principal Problem:    Primary osteoarthritis of left hip  Active Problems:    Essential hypertension    DANN (obstructive sleep apnea)    Gout    Preop testing    HOME SITUATION  Type of Home: House  Home Layout: One level  Lives With: Alone  Occupation/Status: does not work  Drives: Yes      Prior Level of [place]: Prior to admission, patient's baseline is mod I using axillary crutches. Pt lives lone in a one story house, retired, drives.       SUBJECTIVE  \"I flipped over the front of the walker a rehab (in the past)\"    OCCUPATIONAL THERAPY EXAMINATION    OBJECTIVE  Precautions: Hip abduction pillow; FREDY - posterior  Fall Risk: High fall risk    WEIGHT BEARING RESTRICTION  L Lower Extremity: Weight Bearing as Tolerated    PAIN ASSESSMENT  Rating: -- (indicates intermittent severe pain)  Location: LT hip  Management Techniques: Repositioning    ACTIVITY TOLERANCE  Limited by pain  No SOB with bed level/bed side activity    COGNITION  Benefits from repeated cues    ACTIVITIES OF DAILY LIVING ASSESSMENT  AM-PAC ‘6-Clicks’ Inpatient Daily Activity Short Form  How much help from another person does the patient currently need…  -   Putting on and taking off regular lower body clothing?: A Lot  -   Bathing (including washing, rinsing, drying)?: A Lot  -   Toileting, which includes using toilet, bedpan or urinal? : A Lot  -   Putting on and taking off regular upper body clothing?: A Little  -   Taking care of personal grooming such as brushing teeth?: A Little  -   Eating meals?: None    AM-PAC Score:  Score: 16  Approx Degree of Impairment: 53.32%  Standardized Score (AM-PAC Scale): 35.96  CMS Modifier (G-Code): CK    FUNCTIONAL TRANSFER ASSESSMENT  Sit to Stand: Edge of Bed  Edge of Bed: -- (Min A x2 to stand with cues for hand and foot placement; mechanical lift xfer from bed to chair)    BED MOBILITY  Rolling: Not Tested  Supine to Sit : -- (Max A x2)  Sit to Supine (OT): Not Tested    BALANCE ASSESSMENT  Provide supervision for unsupported sitting EOB    FUNCTIONAL ADL ASSESSMENT  Provide Max A for LE self care  Provide Total A for toileting    Skilled Therapy Provided: Pt received resting in bed, visitor at bed side left once session began. Pt emotional throughout session, resistant to therapists' suggestions. Pt demo limited ability to transfer information across self care/mobility skills regarding posterior THP. Pt demo self limiting behavior. Pt repeatedly stating she is not able to use R/W and requesting to use axillary crutches. Pt did not demo the skills to  warrant use of crutches. Education provided to pt in regards to recommendations for R/W. Education will be ongoing.   In this session, provide Max A x2 for supine to sit, Min A x2 to stand from bed with max A. Pt was xferred bed to chair via over head lift.     EDUCATION PROVIDED  Patient: Role of Occupational Therapy; Plan of Care; Discharge Recommendations; Functional Transfer Techniques; Surgical Precautions; Posture/Positioning; Compensatory ADL Techniques; Proper Body Mechanics  Patient's Response to Education: Verbalized Understanding (pt resistant to some recommendations)    The patient's Approx Degree of Impairment: 53.32% has been calculated based on documentation in the [facility] '6 clicks' Inpatient Daily Activity Short Form.  Research supports that patients with this level of impairment may benefit from [facility].  Final disposition will be made by interdisciplinary medical team.    Patient End of Session: Up in chair;Needs met;Call light within reach;RN aware of session/findings;All patient questions and concerns addressed    OT Goals  Patient self-stated goal is: indicates goal is to improve mobility     Patient will complete LE dressing with Min A using LHAE  Comment:     Patient will complete toilet transfer with Min A  Comment:       Comment:    Patient will independently recall posterior hip precautions  Comment:         Goals  on:24  Frequency:3-5x/week    Patient Evaluation Complexity Level:   Occupational Profile/Medical History MODERATE - Expanded review of history including review of medical or therapy record   Specific performance deficits impacting engagement in ADL/IADL MODERATE  3 - 5 performance deficits   Client Assessment/Performance Deficits MODERATE - Comorbidities and min to mod modifications of tasks    Clinical Decision Making MODERATE - Analysis of occupational profile, detailed assessments, several treatment options    Overall Complexity MODERATE       Self-Care Home Management:  45 minutes

## 2024-09-10 NOTE — CM/SW NOTE
Anticipated therapy need: Gradual Rehabilitative Therapy    [facility] ref sent Via [facility].     PASRR done.   Pt case sent to [facility] for review.    SW/CM will continue to follow.   Billie Flores RN, BSN  Nurse   [phone]
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
## 2024-09-10 NOTE — PLAN OF CARE
Post-op day #1. Dressing in place to Left Hip. Monitoring vital signs- stable at this time. Remote tele- discontinued. No acute changes noted throughout shift. Receiving IV fluids per MD order. Monitoring blood glucose levels per order. Tolerating diet. Patient is a check void. SCDs and ASA for DVT prophylaxis. Pain medication provided as needed. Up with max assist. Encouraged frequent ambulation and use of incentive spirometer. Fall precautions maintained- bed alarm on, bed locked in lowest position, call light and personal belongings within reach, non-skid socks in place to bilateral feet. Frequent rounding by nursing staff. Plan to discharge to [facility] once medically cleared.      Problem: Patient Centered Care  Goal: Patient preferences are identified and integrated in the patient's plan of care  Description: Interventions:  - What would you like us to know as we care for you?   - Provide timely, complete, and accurate information to patient/family  - Incorporate patient and family knowledge, values, beliefs, and cultural backgrounds into the planning and delivery of care  - Encourage patient/family to participate in care and decision-making at the level they choose  - Honor patient and family perspectives and choices  Outcome: Progressing     Problem: Patient/Family Goals  Goal: Patient/Family Long Term Goal  Description: Patient's Long Term Goal:     Interventions:  - See additional Care Plan goals for specific interventions  Outcome: Progressing  Goal: Patient/Family Short Term Goal  Description: Patient's Short Term Goal:     Interventions:   - See additional Care Plan goals for specific interventions  Outcome: Progressing     Problem: PAIN - ADULT  Goal: Verbalizes/displays adequate comfort level or patient's stated pain goal  Description: INTERVENTIONS:  - Encourage pt to monitor pain and request assistance  - Assess pain using appropriate pain scale  - Administer analgesics based on type and severity of pain  and evaluate response  - Implement non-pharmacological measures as appropriate and evaluate response  - Consider cultural and social influences on pain and pain management  - Manage/alleviate anxiety  - Utilize distraction and/or relaxation techniques  - Monitor for opioid side effects  - Notify MD/LIP if interventions unsuccessful or patient reports new pain  - Anticipate increased pain with activity and pre-medicate as appropriate  Outcome: Progressing     Problem: RISK FOR INFECTION - ADULT  Goal: Absence of fever/infection during anticipated neutropenic period  Description: INTERVENTIONS  - Monitor WBC  - Administer growth factors as ordered  - Implement neutropenic guidelines  Outcome: Progressing     Problem: SAFETY ADULT - FALL  Goal: Free from fall injury  Description: INTERVENTIONS:  - Assess pt frequently for physical needs  - Identify cognitive and physical deficits and behaviors that affect risk of falls.  - [place] fall precautions as indicated by assessment.  - Educate pt/family on patient safety including physical limitations  - Instruct pt to call for assistance with activity based on assessment  - Modify environment to reduce risk of injury  - Provide assistive devices as appropriate  - Consider OT/PT consult to assist with strengthening/mobility  - Encourage toileting schedule  Outcome: Progressing     Problem: DISCHARGE PLANNING  Goal: Discharge to home or other facility with appropriate resources  Description: INTERVENTIONS:  - Identify barriers to discharge w/pt and caregiver  - Include patient/family/discharge partner in discharge planning  - Arrange for needed discharge resources and transportation as appropriate  - Identify discharge learning needs (meds, wound care, etc)  - Arrange for interpreters to assist at discharge as needed  - Consider post-discharge preferences of patient/family/discharge partner  - Complete POLST form as appropriate  - Assess patient's ability to be responsible  for managing their own health  - Refer to Case Management Department for coordinating discharge planning if the patient needs post-hospital services based on physician/LIP order or complex needs related to functional status, cognitive ability or social support system  Outcome: Progressing

## 2024-09-10 NOTE — PROGRESS NOTES
[facility]  part of [facility]    Progress Note    Maria Elena Moore Patient Status:  Inpatient    1955 MRN [id number]   Location [facility] 4W/SW/SE Attending Tamara Stanton MD   Hosp Day # 1 PCP No primary care provider on file.     Chief Complaint: left hip oa    SUBJECTIVE:    No acute events overnight.  Patient denies chest pain, sob.  No fevers/chills.  No n/v/abd pain.  Having expected post op pain.     10 ROS completed and otherwise negative.    OBJECTIVE:  Vital signs in last 24 hours:  /72 (BP Location: Right arm)   Pulse 80   Temp 98.4 °F (36.9 °C) (Oral)   Resp 16   Ht 5' 7\" (1.702 m)   Wt 236 lb (107 kg)   SpO2 99%   BMI 36.96 kg/m²     Intake/Output:    Intake/Output Summary (Last 24 hours) at 9/10/2024 1204  Last data filed at 9/10/2024 1117  Gross per 24 hour   Intake 1780 ml   Output 575 ml   Net 1205 ml       Wt Readings from Last 3 Encounters:   24 236 lb (107 kg)       Exam     Gen: No acute distress  Pulm: Lungs clear, normal respiratory effort  CV: Heart with regular rate and rhythm  Abd: Abdomen soft, nontender, bowel sounds present  Neuro: No acute focal deficits  MSK: moves extremities  Skin: Warm and dry  Psych: Normal affect  Ext: no c/c/e    Data Review:     Labs:   Lab Results   Component Value Date    WBC 7.8 09/10/2024    HGB 10.0 09/10/2024    HCT 29.0 09/10/2024    .0 09/10/2024    CREATSERUM 1.04 09/10/2024    BUN 25 09/10/2024     09/10/2024    K 3.8 09/10/2024     09/10/2024    CO2 27.0 09/10/2024     09/10/2024    CA 8.6 09/10/2024       Imaging: Reviewed    XR HIP W OR WO PELVIS 2 OR 3 VIEWS, LEFT (CPT=73502)    Result Date: 2024  CONCLUSION: Post left hip arthroplasty without complication.    Dictated by (CST): Bakari Moss MD on 2024 at 5:59 PM     Finalized by (CST): Bakari Moss MD on 2024 at 6:08 PM          XR HIP (1 VIEW) UNILATERAL EM    Result Date:  9/9/2024  CONCLUSION:  1. Left total hip arthroplasty with cerclage cable along the proximal femoral component.    Dictated by (CST): Vitaliy Mcdonough MD on 9/09/2024 at 4:49 PM     Finalized by (CST): Vitaliy Mcdonough MD on 9/09/2024 at 4:50 PM           Meds:   Current Facility-Administered Medications   Medication Dose Route Frequency    lactated ringers infusion   Intravenous Continuous    aspirin DR tab 81 mg  81 mg Oral BID    sennosides (Senokot) tab 17.2 mg  17.2 mg Oral Nightly    docusate sodium (Colace) cap 100 mg  100 mg Oral BID    polyethylene glycol (PEG 3350) (Miralax) 17 g oral packet 17 g  17 g Oral Daily PRN    magnesium hydroxide (Milk of Magnesia) 400 MG/5ML oral suspension 30 mL  30 mL Oral Daily PRN    bisacodyl (Dulcolax) 10 MG rectal suppository 10 mg  10 mg Rectal Daily PRN    fleet enema (Fleet) rectal enema 133 mL  1 enema Rectal Once PRN    ondansetron (Zofran) 4 MG/2ML injection 4 mg  4 mg Intravenous Q6H PRN    metoclopramide (Reglan) 5 mg/mL injection 10 mg  10 mg Intravenous Q8H PRN    famotidine (Pepcid) tab 20 mg  20 mg Oral BID    Or    famotidine (Pepcid) 20 mg/2mL injection 20 mg  20 mg Intravenous BID    diphenhydrAMINE (Benadryl) cap/tab 25 mg  25 mg Oral Q4H PRN    Or    diphenhydrAMINE (Benadryl) 50 mg/mL  injection 12.5 mg  12.5 mg Intravenous Q4H PRN    oxyCODONE immediate release tab 5 mg  5 mg Oral Q4H PRN    Or    oxyCODONE immediate release tab 5 mg  5 mg Oral Q4H PRN    sodium chloride 0.9% infusion   Intravenous Continuous    acetaminophen (Tylenol Extra Strength) tab 1,000 mg  1,000 mg Oral q6h    traMADol (Ultram) tab 50 mg  50 mg Oral 4 times per day    allopurinol (Zyloprim) tab 300 mg  300 mg Oral Twice Weekly    bumetanide (Bumex) tab 0.5 mg  0.5 mg Oral BID (Diuretic)    fluticasone propionate (Flonase) 50 MCG/ACT nasal suspension 1 spray  1 spray Each Nare Daily    oxybutynin (Ditropan) tab 5 mg  5 mg Oral BID    losartan (Cozaar) tab 100 mg  100 mg Oral Daily     [Held by provider] Mirabegron ER (Myrbetriq) 50 MG tablet TB24 50 mg(patient own med)  50 mg Oral QAM    cyclobenzaprine (Flexeril) tab 10 mg  10 mg Oral TID PRN         Assessment  Patient Active Problem List   Diagnosis    Primary osteoarthritis of left hip    Essential hypertension    KIEL (obstructive sleep apnea)    Gout    Preop testing       Plan:     Primary left hip oa  - s/p left total hip arthroplasty  - pain control with orals as needed, IV for breakthrough  - Asa for dvt prophy  - cont pt/ot - may need F[facility] although pt is hoping she can go home    HTN  - monitor vitals and adjust meds prn    BL le lymphedema, chronic  - cont home meds    KIEL  - kiel protocol    Gout  - cont home meds    Chronic back pain   - cont cyclobenzaprine as needed     Prophylaxis:   DVT with asa    Dispo: pending clinical course    Global A/P  - reviewed labs and vitals from today  - reviewed notes of the day  - cbc, bmp, mag ordered for tomorrow  - discussed need to stay in the hospital today due to need for continued pt  - cont IV pain meds prn  - discussed with patient/RN and care team        Greater than 55 minutes spent, Greater than 50% spent counseling and in coordination of care, reviewing labs, discussing results with patient, coordinating care with care team and ordering labs for tomorrow and adjusting medications as needed      Tamara Stanton MD  9/10/2024  12:04 PM    **Certification      PHYSICIAN Certification of Need for Inpatient Hospitalization - Initial Certification    Patient will require inpatient services that will reasonably be expected to span two midnight's based on the clinical documentation in H+P.   Based on patients current state of illness, I anticipate that, after discharge, patient will require TBD.

## 2024-09-10 NOTE — PHYSICAL THERAPY NOTE
PHYSICAL THERAPY EVALUATION - INPATIENT     Room Number: 416/416-A  Evaluation Date: 9/10/2024  Type of Evaluation: Initial   Physician Order: PT Eval and Treat    Presenting Problem: S/P L FREDY 9/9/24  Co-Morbidities : OA, HTN, gout, fibromyalgia, R TKA, bilateral carpal tunnel release, RLE tumor resection, lumbar fusion  Reason for Therapy: Mobility Dysfunction and Discharge Planning    PHYSICAL THERAPY ASSESSMENT   Patient is a 69 year old female admitted 9/9/2024 S/P L FREDY.  Prior to admission, patient's baseline is [place] using bilateral crutches, independent with ADLs.  Patient is currently functioning below baseline with bed mobility, transfers, gait, and stair negotiation.  Patient is requiring dependent assistance as a result of the following impairments: decreased functional strength, pain, impaired standing balance, cognitive deficits (impaired problem solving, decreased insight), and limited LLE ROM.  Physical Therapy will continue to follow for duration of hospitalization.    Patient will benefit from continued skilled PT Services to promote return to prior level of function and safety with continuous assistance and gradual rehabilitative therapy .    PLAN  PT Treatment Plan: Bed mobility;Body mechanics;Endurance;Energy conservation;Patient education;Family education;Gait training;Strengthening;Transfer training;Balance training  Rehab Potential : Fair  Frequency (Obs): Daily    PHYSICAL THERAPY MEDICAL/SOCIAL HISTORY     Problem List  Principal Problem:    Primary osteoarthritis of left hip  Active Problems:    Essential hypertension    DANN (obstructive sleep apnea)    Gout    Preop testing      HOME SITUATION  Home Situation  Type of Home: House  Home Layout: One level  Stairs to Enter : 0  Lives With: Alone  Drives: Yes  Patient Owned Equipment: Crutches;Rolling walker     Prior Level of [place]: Yenifer using bilateral crutches, independent with ADLs    SUBJECTIVE  \"I can't use the walker, my back is  hunched I also need back surgery. I have to use the crutches.\"    PHYSICAL THERAPY EXAMINATION   OBJECTIVE  Precautions: FREDY - posterior  Fall Risk: High fall risk    WEIGHT BEARING RESTRICTION  Weight Bearing Restriction: L lower extremity           L Lower Extremity: Weight Bearing as Tolerated    PAIN ASSESSMENT  Ratin  Location: L hip  Management Techniques: Body mechanics;Repositioning;Activity promotion    COGNITION  Overall Cognitive Status:  Impaired  Perseveration: perseverates during conversation  Problem Solving:  assistance required to identify errors made, assistance required to generate solutions, and assistance required to implement solutions    RANGE OF MOTION AND STRENGTH ASSESSMENT  Upper extremity ROM and strength are within functional limits  BUEs to shoulder height, history of bilateral shoulder pain  Lower extremity ROM is within functional limits  RLE, LLE not assessed 2/2 pain/precautions   Lower extremity strength is within functional limits  RLE, LLE not assessed 2/2 pain/precautions     BALANCE  Static Sitting: Fair +  Dynamic Sitting: Fair  Static Standing: Fair -  Dynamic Standing: Poor +    ACTIVITY TOLERANCE  Pulse: 76  Heart Rate Source: Monitor     BP: 122/72  BP Location: Right arm  BP Method: Automatic  Patient Position: Sitting    O2 WALK  Oxygen Therapy  SPO2% on Room Air at Rest: 96    AM-PAC '6-Clicks' INPATIENT SHORT FORM - BASIC MOBILITY  How much difficulty does the patient currently have...  Patient Difficulty: Turning over in bed (including adjusting bedclothes, sheets and blankets)?: Unable   Patient Difficulty: Sitting down on and standing up from a chair with arms (e.g., wheelchair, bedside commode, etc.): A Lot   Patient Difficulty: Moving from lying on back to sitting on the side of the bed?: Unable   How much help from another person does the patient currently need...   Help from Another: Moving to and from a bed to a chair (including a wheelchair)?: Total   Help  from Another: Need to walk in hospital room?: Total   Help from Another: Climbing 3-5 steps with a railing?: Total     AM-PAC Score:  Raw Score: 7   Approx Degree of Impairment: 92.36%   Standardized Score (AM-PAC Scale): 26.42   CMS Modifier (G-Code): CM    FUNCTIONAL ABILITY STATUS  Functional Mobility/Gait Assessment  Gait Assistance: Not tested (deferred 2/2 limited standing tolerance)  Supine to Sit: dependent - maxAx2  Sit to Stand: dependent - minAx2    Exercise/Education Provided:  Bed mobility  Body mechanics  Energy conservation  Functional activity tolerated  ROM  Transfer training    Skilled Therapy Provided: Pt easily overwhelmed, perseverative on pain and fear of using walker, decreased receptiveness to therapist education and reassurance. Pt able to tolerate static standing with Brian using rolling walker for ~30s, unable to initiate gait at this time 2/2 fear of walker and pain. Pt lifted to chair via overhead lift. Pt requiring increased repetition and reinforcement of posterior hip precautions.     Patient seen for evaluation in coordination with occupational therapy for ADL assessment/education. Patient received semi-fowlers in bed, agreeable to physical therapy evaluation. Vital signs monitored as noted above, patient experiencing dizziness with transition to sitting, BP stable, recovering with seated rest . Education with patient provided verbally, via demonstration, on written handout, and while practicing during session on physical therapy plan of care, physiological benefits of out of bed mobility, posterior hip precautions, weight-bearing status, home safety, and assistive device usage . Next session anticipate to progress bed mobility, transfers, and gait.    Patient history and/or personal factors that may impact the plan of care include home accessibility concerns, limited social support, cognitive deficits, co-morbidities (OA, HTN, gout, fibromyalgia) affecting pain levels, medical  status, prior surgeries (R TKA, bilateral carpal tunnel release, RLE tumor resection, lumbar fusion), and longstanding history of pain. Based on the physical therapy examination of the noted systems and functional activity/participation limitations, the patient presentation is evolving given the patient presents with surgical precautions/limitations and demonstrates cognitive skills affecting safety.      The patient's Approx Degree of Impairment: 92.36% has been calculated based on documentation in the [facility] '6 clicks' Inpatient Basic Mobility Short Form.  Research supports that patients with this level of impairment may benefit from long-term care, however based on patient's PLOF anticipate would benefit from a rehab facility.  Final disposition will be made by interdisciplinary medical team.    Patient End of Session: Up in chair;Needs met;Call light within reach;RN aware of session/findings;All patient questions and concerns addressed    CURRENT GOALS  Goals to be met by: 10/1/24  Patient Goal Patient's self-stated goal is: decrease pain   Goal #1 Patient is able to demonstrate supine - sit EOB @ level: minimum assistance     Goal #1   Current Status    Goal #2 Patient is able to demonstrate transfers EOB to/from BSC at assistance level: contact guard assistance with walker - rolling     Goal #2  Current Status    Goal #3 Patient is able to ambulate 20 feet with assist device: walker - rolling at assistance level: minimum assistance   Goal #3   Current Status    Goal #4 Patient is able to demonstrate transfers sit to/from stand at assistance level: contact guard assistance with rolling walker     Goal #4   Current Status    Goal #5 Patient to demonstrate independence with home activity/exercise instructions provided to patient in preparation for discharge.   Goal #5   Current Status    Goal #6    Goal #6  Current Status      Patient Evaluation Complexity Level:  History High - 3 or more personal factors and/or  co-morbidities   Examination of body systems Moderate - addressing a total of 3 or more elements   Clinical Presentation  Moderate - Evolving   Clinical Decision Making  Moderate Complexity       Therapeutic Activity:  40 minutes      Aishwarya Hinkle, PT, DPT  [facility] Services - Physical Therapy  [id number]

## 2024-09-10 NOTE — PROGRESS NOTES
[facility]     ORTHO DAILY PROGRESS NOTE    Patient: Maria Elena Moore      Subjective:    Nothing acute overnight.  Pain in the operative extremity is moderate and difficult to control.     Patient denies new onset numbness/tingling in the operative extremity.    Patient also denies chest pain / shortness of breath, nausea/vomiting.          Objective:      Vitals:    09/10/24 0504   BP: 110/67   Pulse: 85   Resp: 17   Temp: 97.7 °F (36.5 °C)       I/O last 3 completed shifts:  In: 1010 [I.V.:900; IV PIGGYBACK:110]  Out: -      Gen: awake, alert, not in acute distress    Abdomen nondistended, non-tender      Left Lower Extremity:   Dressing clean, dry, intact.    Sensation intact to light touch in Sural/Saphenous/Tibial/Superficial Peroneal/Deep Peroneal and Tibial distributions.   Motor exam : 5/5 EHL/FHL/TA/GSC.  Vascular exam: Palpable dorsalis pedis pulses. Cap refill ~2s in the toes. Foot warm and well perfused      Labs:      Lab Results   Component Value Date    HGB 12.1 09/09/2024       No results found for: \"INR\", \"PROTIME\"        ASSESSMENT/PLAN: 69 year old yo female s/p left total hip arthroplasty (complex) on 9/9/2024     - Weight bearing status: WBAT LLE  - posterior hip precautions  - abductor pillow when in bed  - Mechanical DVT prophylaxis and chemoprophylaxis with ASA 81 BID   - 24 hours of perioperative antibiotics then 1 week of oral extended prophylaxis  - PT for mobilization and motion  - Advance diet as tolerated. Discontinue IV fluids POD1 if tolerating diet.   - Multimodal pain control regimen ordered  - Disposition: Home with  (case management)    Omar Behery, MD  [facility]

## 2024-09-10 NOTE — CM/SW NOTE
Department  notified of request for HHC, aidin referrals started. Assigned CM/SW to follow up with pt/family on further discharge planning.     Rachael Ortiz, DSC

## 2024-09-10 NOTE — SPIRITUAL CARE NOTE
Spiritual Care Visit Note    Patient Name: Maria Elena Moore Date of Spiritual Care Visit: 09/10/24   : 1955 Primary Dx: Primary osteoarthritis of left hip       Referred By: Referral From: Nurse;Patient, Communion Request    Spiritual Care Taxonomy:    Intended Effects: Aligning care plan with patient's values    Methods: Offer support         Visit Type/Summary:     received consult for communion request and visit. Writer attempted to visit, patient with staff, added to communion list.      - Spiritual Care: Responded to a request via the on call phone Provided support for Patient's spiritual/Worship requests. Coordinated Communion and verified NPO status.  remains available for follow up.    Spiritual Care support can be requested via an Epic consult. For urgent/immediate needs, please contact the On Call  at: [place]: ext [phone]    Chaplain Dulce.

## 2024-09-11 LAB
ANION GAP SERPL CALC-SCNC: 3 MMOL/L (ref 0–18)
BUN BLD-MCNC: 21 MG/DL (ref 9–23)
BUN/CREAT SERPL: 23.3 (ref 10–20)
CALCIUM BLD-MCNC: 8.5 MG/DL (ref 8.7–10.4)
CHLORIDE SERPL-SCNC: 106 MMOL/L (ref 98–112)
CO2 SERPL-SCNC: 30 MMOL/L (ref 21–32)
CREAT BLD-MCNC: 0.9 MG/DL
DEPRECATED RDW RBC AUTO: 44.1 FL (ref 35.1–46.3)
EGFRCR SERPLBLD CKD-EPI 2021: 70 ML/MIN/1.73M2 (ref 60–?)
ERYTHROCYTE [DISTWIDTH] IN BLOOD BY AUTOMATED COUNT: 13.5 % (ref 11–15)
GLUCOSE BLD-MCNC: 112 MG/DL (ref 70–99)
GLUCOSE BLDC GLUCOMTR-MCNC: 131 MG/DL (ref 70–99)
GLUCOSE BLDC GLUCOMTR-MCNC: 136 MG/DL (ref 70–99)
GLUCOSE BLDC GLUCOMTR-MCNC: 139 MG/DL (ref 70–99)
GLUCOSE BLDC GLUCOMTR-MCNC: 180 MG/DL (ref 70–99)
HCT VFR BLD AUTO: 25.1 %
HGB BLD-MCNC: 8.3 G/DL
MAGNESIUM SERPL-MCNC: 1.7 MG/DL (ref 1.6–2.6)
MCH RBC QN AUTO: 29.3 PG (ref 26–34)
MCHC RBC AUTO-ENTMCNC: 33.1 G/DL (ref 31–37)
MCV RBC AUTO: 88.7 FL
OSMOLALITY SERPL CALC.SUM OF ELEC: 292 MOSM/KG (ref 275–295)
PLATELET # BLD AUTO: 98 10(3)UL (ref 150–450)
PLATELETS.RETICULATED NFR BLD AUTO: 5.8 % (ref 0–7)
POTASSIUM SERPL-SCNC: 3.8 MMOL/L (ref 3.5–5.1)
POTASSIUM SERPL-SCNC: 3.8 MMOL/L (ref 3.5–5.1)
RBC # BLD AUTO: 2.83 X10(6)UL
SODIUM SERPL-SCNC: 139 MMOL/L (ref 136–145)
WBC # BLD AUTO: 4.7 X10(3) UL (ref 4–11)

## 2024-09-11 PROCEDURE — 99233 SBSQ HOSP IP/OBS HIGH 50: CPT | Performed by: HOSPITALIST

## 2024-09-11 RX ORDER — MAGNESIUM SULFATE HEPTAHYDRATE 40 MG/ML
2 INJECTION, SOLUTION INTRAVENOUS ONCE
Status: DISCONTINUED | OUTPATIENT
Start: 2024-09-11 | End: 2024-09-11

## 2024-09-11 RX ORDER — MAGNESIUM OXIDE 400 MG/1
400 TABLET ORAL
Status: COMPLETED | OUTPATIENT
Start: 2024-09-11 | End: 2024-09-11

## 2024-09-11 RX ORDER — POTASSIUM CHLORIDE 1500 MG/1
40 TABLET, EXTENDED RELEASE ORAL ONCE
Status: COMPLETED | OUTPATIENT
Start: 2024-09-11 | End: 2024-09-11

## 2024-09-11 NOTE — CM/SW NOTE
09/11/24 1500   Choice of Post-Acute Provider   Informed patient of right to choose their preferred provider Yes   List of appropriate post-acute services provided to patient/family with quality data Yes   Information given to Patient     Social work was able meet with the patient at bedside to discuss [facility].    The patient lives near [place], [place] but no [facility] or HH have accepted in that area.  The [facility] facility that responded ([facility]) stated that they will not provide transport to any follow up appts at [place] and they would like her to follow up with a surgeon in their area which is most likely not possible.     Social work advised the patient that the only accepting [facility] facilities they have accepted are in/or surrounding area. These facilities would provide transport to get to her follow up appointments.    The patient is unwilling to go to a [facility] in [place] or surrounding areas.  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  Social work provided the list of available facilities at bedside.   Social work will update the patient if any other facilities in her area respond.  All referrals have been sent through [employer] and faxed to [facility] facilities.     The patient would like to meet with the Ortho surgeon regarding some concerns.  Attending, Ortho MD, Care Coordination Supervisor and Patient experience updated via epic secure chat.     Social work will follow up.    SW/CM to remain available for support and/or discharge planning.     Altagracia Nunez MSW, LSW  Discharge Planner [id number]

## 2024-09-11 NOTE — CM/SW NOTE
Department  notified care team of [facility] approval, see below.    Assigned SW/CM to follow up with patient/family on discharge plan.       9/11 -- [facility] ID [id number], approved 9/10 to 9/12    Rachael Ortiz DSC

## 2024-09-11 NOTE — PLAN OF CARE
Problem: Patient Centered Care  Goal: Patient preferences are identified and integrated in the patient's plan of care  Description: Interventions:  - What would you like us to know as we care for you? I live in [place].  - Provide timely, complete, and accurate information to patient/family  - Incorporate patient and family knowledge, values, beliefs, and cultural backgrounds into the planning and delivery of care  - Encourage patient/family to participate in care and decision-making at the level they choose  - Honor patient and family perspectives and choices  Outcome: Progressing     Problem: Patient/Family Goals  Goal: Patient/Family Long Term Goal  Description: Patient's Long Term Goal: to resume normal ambulation    Interventions:  - mobilization  - See additional Care Plan goals for specific interventions  Outcome: Progressing  Goal: Patient/Family Short Term Goal  Description: Patient's Short Term Goal: to go to rehab close to home.    Interventions:   - case management initiated.  - See additional Care Plan goals for specific interventions  Outcome: Progressing     Problem: PAIN - ADULT  Goal: Verbalizes/displays adequate comfort level or patient's stated pain goal  Description: INTERVENTIONS:  - Encourage pt to monitor pain and request assistance  - Assess pain using appropriate pain scale  - Administer analgesics based on type and severity of pain and evaluate response  - Implement non-pharmacological measures as appropriate and evaluate response  - Consider cultural and social influences on pain and pain management  - Manage/alleviate anxiety  - Utilize distraction and/or relaxation techniques  - Monitor for opioid side effects  - Notify MD/LIP if interventions unsuccessful or patient reports new pain  - Anticipate increased pain with activity and pre-medicate as appropriate  Outcome: Progressing     Problem: RISK FOR INFECTION - ADULT  Goal: Absence of fever/infection during anticipated neutropenic  period  Description: INTERVENTIONS  - Monitor WBC  - Administer growth factors as ordered  - Implement neutropenic guidelines  Outcome: Progressing     Problem: SAFETY ADULT - FALL  Goal: Free from fall injury  Description: INTERVENTIONS:  - Assess pt frequently for physical needs  - Identify cognitive and physical deficits and behaviors that affect risk of falls.  - [place] fall precautions as indicated by assessment.  - Educate pt/family on patient safety including physical limitations  - Instruct pt to call for assistance with activity based on assessment  - Modify environment to reduce risk of injury  - Provide assistive devices as appropriate  - Consider OT/PT consult to assist with strengthening/mobility  - Encourage toileting schedule  Outcome: Progressing     Problem: DISCHARGE PLANNING  Goal: Discharge to home or other facility with appropriate resources  Description: INTERVENTIONS:  - Identify barriers to discharge w/pt and caregiver  - Include patient/family/discharge partner in discharge planning  - Arrange for needed discharge resources and transportation as appropriate  - Identify discharge learning needs (meds, wound care, etc)  - Arrange for interpreters to assist at discharge as needed  - Consider post-discharge preferences of patient/family/discharge partner  - Complete POLST form as appropriate  - Assess patient's ability to be responsible for managing their own health  - Refer to Case Management Department for coordinating discharge planning if the patient needs post-hospital services based on physician/LIP order or complex needs related to functional status, cognitive ability or social support system  Outcome: Progressing   Was up in a chair for most of the day. Pain is under control with oral pain meds. Plan is for her to go to short-term rehab, likely tomorrow.

## 2024-09-11 NOTE — CONGREGATE LIVING REVIEW
[facility] Living Authorization    The [facility] Review Committee has reviewed this case and the patient IS APPROVED for discharge to a facility for Short Term Skilled once the following procedure is followed:     - The physician discharge instructions (contained within the AMANDA note for SNF) must inlcude the below appropriate and approved COVID instructions to the facility    For questions regarding CLRC approval process, please contact the CM assigned to the case.  For questions regarding RN discharge workflow, please contact the unit Clinical Leader.

## 2024-09-11 NOTE — PLAN OF CARE
Patient Alert and Oriented x4. WBAT. Abductor pillow in place. Max assist with lift. Fall precautions in place. Call light and personal belongings within arms reach. Oxy for pain.   Problem: Patient Centered Care  Goal: Patient preferences are identified and integrated in the patient's plan of care  Description: Interventions:  - What would you like us to know as we care for you?   - Provide timely, complete, and accurate information to patient/family  - Incorporate patient and family knowledge, values, beliefs, and cultural backgrounds into the planning and delivery of care  - Encourage patient/family to participate in care and decision-making at the level they choose  - Honor patient and family perspectives and choices  Outcome: Progressing     Problem: PAIN - ADULT  Goal: Verbalizes/displays adequate comfort level or patient's stated pain goal  Description: INTERVENTIONS:  - Encourage pt to monitor pain and request assistance  - Assess pain using appropriate pain scale  - Administer analgesics based on type and severity of pain and evaluate response  - Implement non-pharmacological measures as appropriate and evaluate response  - Consider cultural and social influences on pain and pain management  - Manage/alleviate anxiety  - Utilize distraction and/or relaxation techniques  - Monitor for opioid side effects  - Notify MD/LIP if interventions unsuccessful or patient reports new pain  - Anticipate increased pain with activity and pre-medicate as appropriate  Outcome: Progressing     Problem: RISK FOR INFECTION - ADULT  Goal: Absence of fever/infection during anticipated neutropenic period  Description: INTERVENTIONS  - Monitor WBC  - Administer growth factors as ordered  - Implement neutropenic guidelines  Outcome: Progressing     Problem: SAFETY ADULT - FALL  Goal: Free from fall injury  Description: INTERVENTIONS:  - Assess pt frequently for physical needs  - Identify cognitive and physical deficits and  behaviors that affect risk of falls.  - [place] fall precautions as indicated by assessment.  - Educate pt/family on patient safety including physical limitations  - Instruct pt to call for assistance with activity based on assessment  - Modify environment to reduce risk of injury  - Provide assistive devices as appropriate  - Consider OT/PT consult to assist with strengthening/mobility  - Encourage toileting schedule  Outcome: Progressing

## 2024-09-11 NOTE — PHYSICAL THERAPY NOTE
PHYSICAL THERAPY HIP TREATMENT NOTE - INPATIENT    Room Number: 416/416-A            Presenting Problem: S/P L FREDY 24  Co-Morbidities : gout, lumbar fusion, MARGA TKA    Problem List  Principal Problem:    Primary osteoarthritis of left hip  Active Problems:    Essential hypertension    DANN (obstructive sleep apnea)    Gout    Preop testing      PHYSICAL THERAPY ASSESSMENT   Patient demonstrates fair progress this session, goals  remain in progress.    Patient is requiring moderate assist and maximum assist as a result of the following impairments: decreased functional strength, pain, decreased muscular endurance, and medical status.     Patient continues to function below baseline with bed mobility, transfers, gait, and stair negotiation.  Next session anticipate patient to progress bed mobility, transfers, and gait.  Physical Therapy will continue to follow patient for duration of hospitalization.    Patient continues to benefit from continued skilled PT services: to promote return to prior level of function and safety with continuous assistance and gradual rehabilitative therapy .    PLAN  PT Treatment Plan: Bed mobility;Body mechanics;Endurance;Energy conservation;Patient education;Family education;Gait training;Strengthening;Transfer training;Balance training  Frequency (Obs): Daily    SUBJECTIVE  \"I just need a minute.\"    OBJECTIVE  Precautions: Hip abduction pillow;FREDY - posterior    WEIGHT BEARING RESTRICTION           L Lower Extremity: Weight Bearing as Tolerated    PAIN ASSESSMENT   Ratin  Location: L hip  Management Techniques: Body mechanics;Activity promotion    BALANCE  Static Sitting: Fair +  Dynamic Sitting: Fair  Static Standing: Fair  Dynamic Standing: Fair -  ACTIVITY TOLERANCE  Pulse: 78                      O2 WALK  Oxygen Therapy  SPO2% on Room Air at Rest: 94    AM-PAC '6-Clicks' INPATIENT SHORT FORM - BASIC MOBILITY  How much difficulty does the patient currently have...  Patient  Difficulty: Turning over in bed (including adjusting bedclothes, sheets and blankets)?: A Lot   Patient Difficulty: Sitting down on and standing up from a chair with arms (e.g., wheelchair, bedside commode, etc.): A Lot   Patient Difficulty: Moving from lying on back to sitting on the side of the bed?: A Lot   How much help from another person does the patient currently need...   Help from Another: Moving to and from a bed to a chair (including a wheelchair)?: A Lot   Help from Another: Need to walk in hospital room?: A Lot   Help from Another: Climbing 3-5 steps with a railing?: Total     AM-PAC Score:  Raw Score: 11   Approx Degree of Impairment: 72.57%   Standardized Score (AM-PAC Scale): 33.86   CMS Modifier (G-Code): CL    FUNCTIONAL ABILITY STATUS  Functional Mobility/Gait Assessment  Gait Assistance: Moderate assistance  Distance (ft): 3'x2 bed>commode>chair  Assistive Device: Rolling walker  Pattern: L Decreased stance time;Shuffle (kyphotic posture 2/2 hx scoliosis, difficulty accepting weight through LLE 2/2 pain, intermittent modA for steadying during L stance time but otherwise CGA-Brian)  Supine to Sit: dependent - modAx2  Sit to Stand: moderate assist and maximum assist - modA from edge of bed, maxA from commode     Skilled Therapy Provided: Since previous session patient has progressed, able to perform x2 standing-step transfers bed>commode>chair using rolling walker, continues to require mod-maxA 1-2 people 2/2 difficulty accepting weight on LLE with pain. Pt remains self-limiting but more agreeable to attempting therapist instructions compared to previous session.    Patient seen for co-treatment with occupational therapy to maximize patient safety and function given limited activity tolerance. Patient received semi-fowlers in bed, agreeable to physical therapy. Vital signs monitored as noted above, patient experiencing lightheadedness with initial transition to sitting, recovering with seated rest .      Education with patient provided verbally and while practicing during session on physical therapy plan of care, physiological benefits of out of bed mobility, posterior hip precautions, and weight-bearing status. Patient with fair carryover during session. Anticipated therapy needs remain appropriate based on the patient's performance, personal factors, and remaining functional impairments.      The patient's Approx Degree of Impairment: 72.57% has been calculated based on documentation in the [facility] '6 clicks' Inpatient Basic Mobility Short Form.  Research supports that patients with this level of impairment may benefit from a rehab facility.  Final disposition will be made by interdisciplinary medical team.      Patient End of Session: Up in chair;Needs met;Call light within reach;All patient questions and concerns addressed;Alarm set    CURRENT GOALS   Goals to be met by: 10/1/24  Patient Goal Patient's self-stated goal is: decrease pain   Goal #1 Patient is able to demonstrate supine - sit EOB @ level: minimum assistance     Goal #1   Current Status NOT MET/IN PROGRESS    Goal #2 Patient is able to demonstrate transfers EOB to/from BSC at assistance level: contact guard assistance with walker - rolling     Goal #2  Current Status NOT MET/IN PROGRESS    Goal #3 Patient is able to ambulate 20 feet with assist device: walker - rolling at assistance level: minimum assistance   Goal #3   Current Status NOT MET/IN PROGRESS    Goal #4 Patient is able to demonstrate transfers sit to/from stand at assistance level: contact guard assistance with rolling walker     Goal #4   Current Status NOT MET/IN PROGRESS    Goal #5 Patient to demonstrate independence with home activity/exercise instructions provided to patient in preparation for discharge.   Goal #5   Current Status IN PROGRESS/ONGOING    Goal #6    Goal #6  Current Status        Therapeutic Activity: 30 minutes      Aishwarya Hinkle PT, DPT  [place]   [facility]  Rehab Services - Physical Therapy  [id number]

## 2024-09-11 NOTE — OCCUPATIONAL THERAPY NOTE
OCCUPATIONAL THERAPY TREATMENT NOTE - INPATIENT    Room Number: 416/416-A    Presenting Problem: LT FREDY     Problem List  Principal Problem:    Primary osteoarthritis of left hip  Active Problems:    Essential hypertension    DANN (obstructive sleep apnea)    Gout    Preop testing    OCCUPATIONAL THERAPY ASSESSMENT   Patient demonstrates good  progress this session, goals remain in progress.    Patient is requiring up to max assist for ADLs as a result of the following impairments: decreased functional strength, decreased endurance, pain, impaired balance, decreased muscular endurance, and limited LLE ROM.    Patient continues to function below baseline with ADLs, functional mobility, and transfers. Next session anticipate patient to progress toileting, lower body dressing, bed mobility, transfers, static standing balance, and dynamic standing balance.  Occupational Therapy will continue to follow patient for duration of hospitalization.    Patient continues to benefit from continued skilled OT services: to promote return to prior level of function and safety with continuous assistance and gradual rehabilitative therapy.     PLAN  OT Treatment Plan: ADL training;Functional transfer training;Patient/Family education;Patient/Family training;Equipment eval/education;Compensatory technique education  OT Device Recommendations: TBD    SUBJECTIVE  \"I have to use the bathroom.\"    OBJECTIVE  Precautions: FREDY - posterior;Drain(s);Hip abduction pillow    WEIGHT BEARING RESTRICTION  L Lower Extremity: Weight Bearing as Tolerated    PAIN ASSESSMENT  Rating: -- (unrated)  Location: L hip  Management Techniques: Activity promotion; Body mechanics; Breathing techniques; Repositioning    ACTIVITY TOLERANCE  Pulse: 78 (pre-activity)    O2 SATURATIONS  Oxygen Therapy  SPO2% on Room Air at Rest: 94    ACTIVITIES OF DAILY LIVING ASSESSMENT  AM-PAC ‘6-Clicks’ Inpatient Daily Activity Short Form  How much help from another person does  the patient currently need…  -   Putting on and taking off regular lower body clothing?: A Lot  -   Bathing (including washing, rinsing, drying)?: A Lot  -   Toileting, which includes using toilet, bedpan or urinal? : A Lot  -   Putting on and taking off regular upper body clothing?: A Little  -   Taking care of personal grooming such as brushing teeth?: A Little  -   Eating meals?: None    AM-PAC Score:  Score: 16  Approx Degree of Impairment: 53.32%  Standardized Score (AM-PAC Scale): 35.96  CMS Modifier (G-Code): CK    BED MOBILITY  Supine to Sit: mod assist x2  Comments:  Patient's static sitting balance was CGA progressing to SBA. Patient required assist for scooting forward to place feet flat on floor d/t pain and cues for hand placement throughout bed mobility. Demonstrated R side lean d/t pain with WB through L hip.    FUNCTIONAL TRANSFER ASSESSMENT  Sit to Stand from EOB: mod assist w/ RW  Sit to Stand from Rolling Chair: max assist w/ RW  Rolling Chair Transfer: min assist w/ RW for controlled descent  Comments:  Throughout transfers, patient benefited from encouragement, education on PLB for pain management, and re-education on extending L LE anteriorly during transfers to ensure posterior hip precaution adherence. Patient demonstrated limited standing tolerance d/t pain, weakness, and fatigue. C/o chronic back, B shoulder, and B hip issues.    FUNCTIONAL MOBILITY  Mod assist for steps to/from rolling chair using RW  Comments:  Patient ambulated a few steps from EOB>rolling chair>bedside chair with multiple verbal cues for initiating footing and positioning RW in front of self at all times.     ACTIVITIES OF DAILY LIVING  LB Dressing: max assist  Toileting: max assist   Comments:   Patient able to recall 2/3 posterior hip precautions (except no internal rotation).     EDUCATION PROVIDED  Patient: Role of Occupational Therapy; Plan of Care; Discharge Recommendations; Functional Transfer Techniques; Fall  Prevention; Weight Bear Status; Surgical Precautions; Posture/Positioning; Proper Body Mechanics  Patient's Response to Education: Verbalized Understanding; Returned Demonstration    The patient's Approx Degree of Impairment: 53.32% has been calculated based on documentation in the [facility] '6 clicks' Inpatient Daily Activity Short Form.  Research supports that patients with this level of impairment may benefit from rehab. Final disposition will be made by interdisciplinary medical team.    Patient End of Session: Up in chair;Needs met;Call light within reach;RN aware of session/findings;All patient questions and concerns addressed    OT Goals:    Patient self-stated goal is: indicates goal is to improve mobility     Patient will complete LE dressing with Min A using LHAE  Comment: ongoing    Patient will complete toilet transfer with Min A  Comment: ongoing    Patient will independently recall posterior hip precautions  Comment: ongoing, 2/3         Goals  on:24  Frequency: 3-5x/week    OT Session Time:   Self-Care Home Management: 15 minutes  Therapeutic Activity: 17 minutes    [facility]  OT Student  ___________________________________________    I provided clinical instruction and supervision for the duration of this session and agree with the above documentation.    COLIN Francis/L  [facility]  #[id number]

## 2024-09-11 NOTE — PROGRESS NOTES
[facility]  part of [facility]    Progress Note    Maria Elena Moore Patient Status:  Inpatient    1955 MRN [id number]   Location [facility] 4W/SW/SE Attending Tamara Stanton MD   Hosp Day # 2 PCP No primary care provider on file.     Chief Complaint: left hip oa    SUBJECTIVE:    No acute events overnight.  Patient denies chest pain, sob.  No fevers/chills.  No n/v/abd pain.  Nervous about working with PT.     10 ROS completed and otherwise negative.    OBJECTIVE:  Vital signs in last 24 hours:  /52 (BP Location: Right arm)   Pulse 80   Temp 98.4 °F (36.9 °C)   Resp 16   Ht 5' 7\" (1.702 m)   Wt 236 lb (107 kg)   SpO2 95%   BMI 36.96 kg/m²     Intake/Output:    Intake/Output Summary (Last 24 hours) at 2024 1828  Last data filed at 2024 1236  Gross per 24 hour   Intake 120 ml   Output 850 ml   Net -730 ml       Wt Readings from Last 3 Encounters:   24 236 lb (107 kg)       Exam     Gen: No acute distress  Pulm: Lungs clear, normal respiratory effort  CV: Heart with regular rate and rhythm  Abd: Abdomen soft, nontender, bowel sounds present  Neuro: No acute focal deficits  MSK: moves extremities  Skin: Warm and dry  Psych: Normal affect  Ext: no c/c/e    Data Review:     Labs:   Lab Results   Component Value Date    WBC 4.7 2024    HGB 8.3 2024    HCT 25.1 2024    PLT 98.0 2024    CREATSERUM 0.90 2024    BUN 21 2024     2024    K 3.8 2024    K 3.8 2024     2024    CO2 30.0 2024     2024    CA 8.5 2024    MG 1.7 2024       Imaging: Reviewed    XR HIP W OR WO PELVIS 2 OR 3 VIEWS, LEFT (CPT=73502)    Result Date: 2024  CONCLUSION: Post left hip arthroplasty without complication.    Dictated by (CST): Bakari Moss MD on 2024 at 5:59 PM     Finalized by (CST): Bakari Moss MD on 2024 at 6:08 PM          XR HIP (1 VIEW) UNILATERAL  EM    Result Date: 9/9/2024  CONCLUSION:  1. Left total hip arthroplasty with cerclage cable along the proximal femoral component.    Dictated by (CST): Vitaliy Mcdonough MD on 9/09/2024 at 4:49 PM     Finalized by (CST): Vitaliy Mcdonough MD on 9/09/2024 at 4:50 PM           Meds:   Current Facility-Administered Medications   Medication Dose Route Frequency    valsartan (Diovan) tab 160 mg (PATIENT SUPPLIED)  160 mg Oral Daily    [START ON 9/13/2024] metOLazone (Zaroxolyn) tab 2.5 mg (PATIENT SUPPLIED)  2.5 mg Oral Once per day on Monday Friday    tolterodine ER (Detrol LA)  2 mg capsule (PATIENT SUPPLIED)  2 mg Oral BID    Mirabegron ER (MYRBETRIQ) 25 MG tablet (PATIENT SUPPLIED)  25 mg Oral Daily    lactated ringers infusion   Intravenous Continuous    aspirin DR tab 81 mg  81 mg Oral BID    sennosides (Senokot) tab 17.2 mg  17.2 mg Oral Nightly    docusate sodium (Colace) cap 100 mg  100 mg Oral BID    polyethylene glycol (PEG 3350) (Miralax) 17 g oral packet 17 g  17 g Oral Daily PRN    magnesium hydroxide (Milk of Magnesia) 400 MG/5ML oral suspension 30 mL  30 mL Oral Daily PRN    bisacodyl (Dulcolax) 10 MG rectal suppository 10 mg  10 mg Rectal Daily PRN    fleet enema (Fleet) rectal enema 133 mL  1 enema Rectal Once PRN    ondansetron (Zofran) 4 MG/2ML injection 4 mg  4 mg Intravenous Q6H PRN    metoclopramide (Reglan) 5 mg/mL injection 10 mg  10 mg Intravenous Q8H PRN    famotidine (Pepcid) tab 20 mg  20 mg Oral BID    Or    famotidine (Pepcid) 20 mg/2mL injection 20 mg  20 mg Intravenous BID    diphenhydrAMINE (Benadryl) cap/tab 25 mg  25 mg Oral Q4H PRN    Or    diphenhydrAMINE (Benadryl) 50 mg/mL  injection 12.5 mg  12.5 mg Intravenous Q4H PRN    oxyCODONE immediate release tab 5 mg  5 mg Oral Q4H PRN    Or    oxyCODONE immediate release tab 5 mg  5 mg Oral Q4H PRN    sodium chloride 0.9% infusion   Intravenous Continuous    acetaminophen (Tylenol Extra Strength) tab 1,000 mg  1,000 mg Oral q6h    traMADol  (Ultram) tab 50 mg  50 mg Oral 4 times per day    allopurinol (Zyloprim) tab 300 mg  300 mg Oral Twice Weekly    bumetanide (Bumex) tab 0.5 mg  0.5 mg Oral BID (Diuretic)    fluticasone propionate (Flonase) 50 MCG/ACT nasal suspension 1 spray  1 spray Each Nare Daily    cyclobenzaprine (Flexeril) tab 10 mg  10 mg Oral TID PRN         Assessment  Patient Active Problem List   Diagnosis    Primary osteoarthritis of left hip    Essential hypertension    KIEL (obstructive sleep apnea)    Gout    Preop testing       Plan:     Primary left hip oa  - s/p left total hip arthroplasty  - pain control with orals as needed, IV for breakthrough  - Asa for dvt prophy  - cont pt/ot - may need [facility] although pt is hoping she can go home    HTN  - monitor vitals and adjust meds prn    BL le lymphedema, chronic  - cont home meds    KIEL  - kiel protocol    Gout  - cont home meds    Chronic back pain   - cont cyclobenzaprine as needed     Prophylaxis:   DVT with asa    Dispo: pending clinical course    Global A/P  - reviewed labs and vitals from today  - reviewed notes of the day  - cbc, bmp, mag ordered for tomorrow  - discussed need to stay in the hospital today due to need for continued pt  - cont IV pain meds prn  - discussed with patient/RN and care team        Greater than 55 minutes spent, Greater than 50% spent counseling and in coordination of care, reviewing labs, discussing results with patient, coordinating care with care team and ordering labs for tomorrow and adjusting medications as needed

## 2024-09-12 LAB
DEPRECATED RDW RBC AUTO: 48.7 FL (ref 35.1–46.3)
ERYTHROCYTE [DISTWIDTH] IN BLOOD BY AUTOMATED COUNT: 13.5 % (ref 11–15)
GLUCOSE BLDC GLUCOMTR-MCNC: 107 MG/DL (ref 70–99)
GLUCOSE BLDC GLUCOMTR-MCNC: 183 MG/DL (ref 70–99)
GLUCOSE BLDC GLUCOMTR-MCNC: 185 MG/DL (ref 70–99)
HCT VFR BLD AUTO: 26.5 %
HGB BLD-MCNC: 8 G/DL
MAGNESIUM SERPL-MCNC: 1.7 MG/DL (ref 1.6–2.6)
MCH RBC QN AUTO: 29.5 PG (ref 26–34)
MCHC RBC AUTO-ENTMCNC: 30.2 G/DL (ref 31–37)
MCV RBC AUTO: 97.8 FL
PLATELET # BLD AUTO: 101 10(3)UL (ref 150–450)
PLATELETS.RETICULATED NFR BLD AUTO: 3.8 % (ref 0–7)
POTASSIUM SERPL-SCNC: 4 MMOL/L (ref 3.5–5.1)
RBC # BLD AUTO: 2.71 X10(6)UL
WBC # BLD AUTO: 4.8 X10(3) UL (ref 4–11)

## 2024-09-12 PROCEDURE — 99233 SBSQ HOSP IP/OBS HIGH 50: CPT | Performed by: HOSPITALIST

## 2024-09-12 RX ORDER — DOXYCYCLINE 100 MG/1
100 CAPSULE ORAL EVERY 12 HOURS SCHEDULED
Status: DISCONTINUED | OUTPATIENT
Start: 2024-09-12 | End: 2024-09-13

## 2024-09-12 RX ORDER — MAGNESIUM OXIDE 400 MG/1
400 TABLET ORAL ONCE
Status: COMPLETED | OUTPATIENT
Start: 2024-09-12 | End: 2024-09-12

## 2024-09-12 NOTE — CM/SW NOTE
09/12/24 1500   Choice of Post-Acute Provider   Informed patient of right to choose their preferred provider Yes   List of appropriate post-acute services provided to patient/family with quality data Yes   Patient/family choice [facility]   Information given to Patient     Social work was able to meet with the patient at bedside regarding [facility] choice.    The patient has decided to go with [facility].    [facility] is reserved in [facility].    NEED FOR DISCHARGE: INSURANCE AUTH AND MEDICAL CLEARANCE.    SW/CM to remain available for support and/or discharge planning.     Altagracia Nunez MSW, LSW  Discharge Planner [id number]

## 2024-09-12 NOTE — PROGRESS NOTES
[facility]  part of [facility]    Progress Note    Maria Elena Moore Patient Status:  Inpatient    1955 MRN [id number]   Location [facility] 4W/SW/SE Attending Tamara Stanton MD   Hosp Day # 3 PCP No primary care provider on file.     Chief Complaint: left hip oa    SUBJECTIVE:    No acute events overnight.  Patient denies chest pain, sob.  No fevers/chills.  No n/v/abd pain.  Very anxious about rehab.     10 ROS completed and otherwise negative.    OBJECTIVE:  Vital signs in last 24 hours:  /49 (BP Location: Right arm)   Pulse 74   Temp 98.1 °F (36.7 °C) (Oral)   Resp 18   Ht 5' 7\" (1.702 m)   Wt 236 lb (107 kg)   SpO2 96%   BMI 36.96 kg/m²     Intake/Output:    Intake/Output Summary (Last 24 hours) at 2024 1237  Last data filed at 2024 0600  Gross per 24 hour   Intake --   Output 700 ml   Net -700 ml       Wt Readings from Last 3 Encounters:   24 236 lb (107 kg)       Exam     Gen: No acute distress  Pulm: Lungs clear, normal respiratory effort  CV: Heart with regular rate and rhythm  Abd: Abdomen soft, nontender, bowel sounds present  Neuro: No acute focal deficits  MSK: moves extremities  Skin: Warm and dry  Psych: Normal affect  Ext: no c/c/e    Data Review:     Labs:   Lab Results   Component Value Date    WBC 4.8 2024    HGB 8.0 2024    HCT 26.5 2024    .0 2024    K 4.0 2024    MG 1.7 2024       Imaging: Reviewed    XR HIP W OR WO PELVIS 2 OR 3 VIEWS, LEFT (CPT=73502)    Result Date: 2024  CONCLUSION: Post left hip arthroplasty without complication.    Dictated by (CST): Bakari Moss MD on 2024 at 5:59 PM     Finalized by (CST): Bakari Moss MD on 2024 at 6:08 PM          XR HIP (1 VIEW) UNILATERAL EM    Result Date: 2024  CONCLUSION:  1. Left total hip arthroplasty with cerclage cable along the proximal femoral component.    Dictated by (CST): Vitaliy Mcdonough MD on 2024 at  4:49 PM     Finalized by (CST): Vitaliy Mcdonough MD on 9/09/2024 at 4:50 PM           Meds:   Current Facility-Administered Medications   Medication Dose Route Frequency    valsartan (Diovan) tab 160 mg (PATIENT SUPPLIED)  160 mg Oral Daily    [START ON 9/13/2024] metOLazone (Zaroxolyn) tab 2.5 mg (PATIENT SUPPLIED)  2.5 mg Oral Once per day on Monday Friday    tolterodine ER (Detrol LA)  2 mg capsule (PATIENT SUPPLIED)  2 mg Oral BID    Mirabegron ER (MYRBETRIQ) 25 MG tablet (PATIENT SUPPLIED)  25 mg Oral Daily    lactated ringers infusion   Intravenous Continuous    aspirin DR tab 81 mg  81 mg Oral BID    sennosides (Senokot) tab 17.2 mg  17.2 mg Oral Nightly    docusate sodium (Colace) cap 100 mg  100 mg Oral BID    polyethylene glycol (PEG 3350) (Miralax) 17 g oral packet 17 g  17 g Oral Daily PRN    magnesium hydroxide (Milk of Magnesia) 400 MG/5ML oral suspension 30 mL  30 mL Oral Daily PRN    bisacodyl (Dulcolax) 10 MG rectal suppository 10 mg  10 mg Rectal Daily PRN    fleet enema (Fleet) rectal enema 133 mL  1 enema Rectal Once PRN    ondansetron (Zofran) 4 MG/2ML injection 4 mg  4 mg Intravenous Q6H PRN    metoclopramide (Reglan) 5 mg/mL injection 10 mg  10 mg Intravenous Q8H PRN    famotidine (Pepcid) tab 20 mg  20 mg Oral BID    Or    famotidine (Pepcid) 20 mg/2mL injection 20 mg  20 mg Intravenous BID    diphenhydrAMINE (Benadryl) cap/tab 25 mg  25 mg Oral Q4H PRN    Or    diphenhydrAMINE (Benadryl) 50 mg/mL  injection 12.5 mg  12.5 mg Intravenous Q4H PRN    oxyCODONE immediate release tab 5 mg  5 mg Oral Q4H PRN    Or    oxyCODONE immediate release tab 5 mg  5 mg Oral Q4H PRN    sodium chloride 0.9% infusion   Intravenous Continuous    acetaminophen (Tylenol Extra Strength) tab 1,000 mg  1,000 mg Oral q6h    traMADol (Ultram) tab 50 mg  50 mg Oral 4 times per day    allopurinol (Zyloprim) tab 300 mg  300 mg Oral Twice Weekly    bumetanide (Bumex) tab 0.5 mg  0.5 mg Oral BID (Diuretic)    fluticasone  propionate (Flonase) 50 MCG/ACT nasal suspension 1 spray  1 spray Each Nare Daily    cyclobenzaprine (Flexeril) tab 10 mg  10 mg Oral TID PRN         Assessment  Patient Active Problem List   Diagnosis    Primary osteoarthritis of left hip    Essential hypertension    KIEL (obstructive sleep apnea)    Gout    Preop testing       Plan:     Primary left hip oa  - s/p left total hip arthroplasty  - pain control with orals as needed, IV for breakthrough  - Asa for dvt prophy  - cont pt/ot - will need FARIBA    Acute expected post op blood loss anemia  - transfuse if hgb less than 7, will monitor    HTN  - monitor vitals and adjust meds prn    BL le lymphedema, chronic  - cont home meds    KIEL  - kiel protocol    Gout  - cont home meds    Chronic back pain   - cont cyclobenzaprine as needed     Prophylaxis:   DVT with asa    Dispo: pending clinical course    Global A/P  - reviewed labs and vitals from today  - reviewed notes of the day  - cbc, bmp, mag ordered for tomorrow  - discussed need to stay in the hospital today due to need for FARIBA - cleared medically for dc  - cont IV pain meds prn  - discussed with patient/RN and care team        Greater than 55 minutes spent, Greater than 50% spent counseling and in coordination of care, reviewing labs, discussing results with patient, coordinating care with care team and ordering labs for tomorrow and adjusting medications as needed

## 2024-09-12 NOTE — PLAN OF CARE
Problem: Patient Centered Care  Goal: Patient preferences are identified and integrated in the patient's plan of care  Description: Interventions:  - What would you like us to know as we care for you?   - Provide timely, complete, and accurate information to patient/family  - Incorporate patient and family knowledge, values, beliefs, and cultural backgrounds into the planning and delivery of care  - Encourage patient/family to participate in care and decision-making at the level they choose  - Honor patient and family perspectives and choices  Outcome: Progressing     Problem: Patient/Family Goals  Goal: Patient/Family Long Term Goal  Description: Patient's Long Term Goal:     Interventions:  - PT/OT  - Pain management  - See additional Care Plan goals for specific interventions  Outcome: Progressing  Goal: Patient/Family Short Term Goal  Description: Patient's Short Term Goal:     Interventions:   - Pain management  - PT/OT  - Monitor lab  - See additional Care Plan goals for specific interventions  Outcome: Progressing     Problem: PAIN - ADULT  Goal: Verbalizes/displays adequate comfort level or patient's stated pain goal  Description: INTERVENTIONS:  - Encourage pt to monitor pain and request assistance  - Assess pain using appropriate pain scale  - Administer analgesics based on type and severity of pain and evaluate response  - Implement non-pharmacological measures as appropriate and evaluate response  - Consider cultural and social influences on pain and pain management  - Manage/alleviate anxiety  - Utilize distraction and/or relaxation techniques  - Monitor for opioid side effects  - Notify MD/LIP if interventions unsuccessful or patient reports new pain  - Anticipate increased pain with activity and pre-medicate as appropriate  Outcome: Progressing     Problem: RISK FOR INFECTION - ADULT  Goal: Absence of fever/infection during anticipated neutropenic period  Description: INTERVENTIONS  - Monitor WBC  -  Administer growth factors as ordered  - Implement neutropenic guidelines  Outcome: Progressing     Problem: SAFETY ADULT - FALL  Goal: Free from fall injury  Description: INTERVENTIONS:  - Assess pt frequently for physical needs  - Identify cognitive and physical deficits and behaviors that affect risk of falls.  - [place] fall precautions as indicated by assessment.  - Educate pt/family on patient safety including physical limitations  - Instruct pt to call for assistance with activity based on assessment  - Modify environment to reduce risk of injury  - Provide assistive devices as appropriate  - Consider OT/PT consult to assist with strengthening/mobility  - Encourage toileting schedule  Outcome: David Arredondo was up on the recliner at beginning of shift. She was transferred back to bed with a lift d/t pt being max assist. Her pain was managed with scheduled pain meds and PRN Oxy at this time. She's voiding freely via purewick external catheter. Accucheck AC/HS done. Plan for discharge to rehab pending insurance approval.

## 2024-09-12 NOTE — PLAN OF CARE
Problem: Patient Centered Care  Goal: Patient preferences are identified and integrated in the patient's plan of care  Description: Interventions:  - What would you like us to know as we care for you? I live in the [facility].  - Provide timely, complete, and accurate information to patient/family  - Incorporate patient and family knowledge, values, beliefs, and cultural backgrounds into the planning and delivery of care  - Encourage patient/family to participate in care and decision-making at the level they choose  - Honor patient and family perspectives and choices  Outcome: Progressing     Problem: Patient/Family Goals  Goal: Patient/Family Long Term Goal  Description: Patient's Long Term Goal: to be able to walk with out pain    Interventions:  - surgery, physical therapy  - See additional Care Plan goals for specific interventions  Outcome: Progressing  Goal: Patient/Family Short Term Goal  Description: Patient's Short Term Goal: to be able to go to rehab    Interventions:   - case management  - See additional Care Plan goals for specific interventions  Outcome: Progressing     Problem: PAIN - ADULT  Goal: Verbalizes/displays adequate comfort level or patient's stated pain goal  Description: INTERVENTIONS:  - Encourage pt to monitor pain and request assistance  - Assess pain using appropriate pain scale  - Administer analgesics based on type and severity of pain and evaluate response  - Implement non-pharmacological measures as appropriate and evaluate response  - Consider cultural and social influences on pain and pain management  - Manage/alleviate anxiety  - Utilize distraction and/or relaxation techniques  - Monitor for opioid side effects  - Notify MD/LIP if interventions unsuccessful or patient reports new pain  - Anticipate increased pain with activity and pre-medicate as appropriate  Outcome: Progressing     Problem: RISK FOR INFECTION - ADULT  Goal: Absence of fever/infection during anticipated  neutropenic period  Description: INTERVENTIONS  - Monitor WBC  - Administer growth factors as ordered  - Implement neutropenic guidelines  Outcome: Progressing     Problem: SAFETY ADULT - FALL  Goal: Free from fall injury  Description: INTERVENTIONS:  - Assess pt frequently for physical needs  - Identify cognitive and physical deficits and behaviors that affect risk of falls.  - [place] fall precautions as indicated by assessment.  - Educate pt/family on patient safety including physical limitations  - Instruct pt to call for assistance with activity based on assessment  - Modify environment to reduce risk of injury  - Provide assistive devices as appropriate  - Consider OT/PT consult to assist with strengthening/mobility  - Encourage toileting schedule  Outcome: Progressing     Problem: DISCHARGE PLANNING  Goal: Discharge to home or other facility with appropriate resources  Description: INTERVENTIONS:  - Identify barriers to discharge w/pt and caregiver  - Include patient/family/discharge partner in discharge planning  - Arrange for needed discharge resources and transportation as appropriate  - Identify discharge learning needs (meds, wound care, etc)  - Arrange for interpreters to assist at discharge as needed  - Consider post-discharge preferences of patient/family/discharge partner  - Complete POLST form as appropriate  - Assess patient's ability to be responsible for managing their own health  - Refer to Case Management Department for coordinating discharge planning if the patient needs post-hospital services based on physician/LIP order or complex needs related to functional status, cognitive ability or social support system  Outcome: Progressing   WAs up in chair for most of the day. Pending FARIBA transfer when approved.

## 2024-09-12 NOTE — PHYSICAL THERAPY NOTE
PHYSICAL THERAPY TREATMENT NOTE - INPATIENT     Room Number: 416/416-A       Presenting Problem: S/P L FREDY 24  Co-Morbidities : gout, lumbar fusion, MARGA TKA    Problem List  Principal Problem:    Primary osteoarthritis of left hip  Active Problems:    Essential hypertension    DANN (obstructive sleep apnea)    Gout    Preop testing      PHYSICAL THERAPY ASSESSMENT   Patient demonstrates fair progress this session, goals  remain in progress.      Patient is requiring maximum assist x2-3 as a result of the following impairments: decreased functional strength, decreased endurance/aerobic capacity, pain, impaired static and dynamic balance, decreased muscular endurance, and limited LLE ROM.     Patient continues to function below baseline with bed mobility, transfers, gait, stair negotiation, standing prolonged periods, and performing household tasks.  Next session anticipate patient to progress bed mobility, transfers, and gait.  Physical Therapy will continue to follow patient for duration of hospitalization.    Patient continues to benefit from continued skilled PT services: to promote return to prior level of function and safety with continuous assistance and gradual rehabilitative therapy .    PLAN  PT Treatment Plan: Bed mobility;Body mechanics;Endurance;Energy conservation;Patient education;Family education;Gait training;Strengthening;Transfer training;Balance training  Frequency (Obs): Daily    SUBJECTIVE  I can't lift my leg    OBJECTIVE  Precautions: FREDY - posterior;Drain(s);Hip abduction pillow    WEIGHT BEARING RESTRICTION           L Lower Extremity: Weight Bearing as Tolerated    PAIN ASSESSMENT   Ratin  Location: L hip  Management Techniques: Activity promotion;Body mechanics;Breathing techniques;Repositioning    BALANCE  Static Sitting: Fair +  Dynamic Sitting: Fair  Static Standing: Poor +  Dynamic Standing: Poor        AM-PAC '6-Clicks' INPATIENT SHORT FORM - BASIC MOBILITY  How much difficulty  does the patient currently have...  Patient Difficulty: Turning over in bed (including adjusting bedclothes, sheets and blankets)?: A Lot   Patient Difficulty: Sitting down on and standing up from a chair with arms (e.g., wheelchair, bedside commode, etc.): A Lot   Patient Difficulty: Moving from lying on back to sitting on the side of the bed?: A Lot   How much help from another person does the patient currently need...   Help from Another: Moving to and from a bed to a chair (including a wheelchair)?: A Lot   Help from Another: Need to walk in hospital room?: Total   Help from Another: Climbing 3-5 steps with a railing?: Total     AM-PAC Score:  Raw Score: 10   Approx Degree of Impairment: 76.75%   Standardized Score (AM-PAC Scale): 32.29   CMS Modifier (G-Code): CL    FUNCTIONAL ABILITY STATUS  Functional Mobility/Gait Assessment  Gait Assistance: Not tested  Distance (ft): 3'x2 bed>commode>chair  Assistive Device: Rolling walker  Pattern: L Decreased stance time;Shuffle (kyphotic posture 2/2 hx scoliosis, difficulty accepting weight through LLE 2/2 pain, intermittent modA for steadying during L stance time but otherwise CGA-Brian)  Rolling: maximum assist x2   Sit to Stand: maximum assist x3 to RW    Skilled Therapy Provided: PT Tech present to assist with session. Pt rec'd in bed agreeable to therapy, identified by name and , gait belt donned for mobility. Pt reporting difficulty moving LLE 2/2 pain and weakness. Pt transferred bed to chair via eliza lift, max A x2 for rolling R and L to don sling. Pt required Max A to scoot hips forwards in chair for proper BLE placement to initiate STS transfer. Pt able to stand to RW with Max A x3 and use of pulling up on RW with walker stabilized by therapists. Once in standing required Min A to maintain upright balance and cues for upright posture, only able to shift weight laterally but unable to perform alternate marching 2/2 LLE pain and weakness. Pt stood for a total  of ~5 mins before sitting back down in chair. Able to perform seated BLE therex for carry over to improve functional strength for transfers and ambulation. Recommend use of lift back to bed, PCT aware.       The patient's Approx Degree of Impairment: 76.75% has been calculated based on documentation in the [facility] '6 clicks' Inpatient Daily Activity Short Form.  Research supports that patients with this level of impairment may benefit from rehab.  Final disposition will be made by interdisciplinary medical team.    THERAPEUTIC EXERCISES  Lower Extremity Ankle pumps  Heel slides  Quad sets     Position Sitting       Patient End of Session: Up in chair;Needs met;Call light within reach;RN aware of session/findings    CURRENT GOALS   Goals to be met by: 10/1/24  Patient Goal Patient's self-stated goal is: decrease pain   Goal #1 Patient is able to demonstrate supine - sit EOB @ level: minimum assistance      Goal #1   Current Status NOT MET/IN PROGRESS    Goal #2 Patient is able to demonstrate transfers EOB to/from BSC at assistance level: contact guard assistance with walker - rolling      Goal #2  Current Status NOT MET/IN PROGRESS    Goal #3 Patient is able to ambulate 20 feet with assist device: walker - rolling at assistance level: minimum assistance   Goal #3   Current Status NOT MET/IN PROGRESS    Goal #4 Patient is able to demonstrate transfers sit to/from stand at assistance level: contact guard assistance with rolling walker      Goal #4   Current Status NOT MET/IN PROGRESS    Goal #5 Patient to demonstrate independence with home activity/exercise instructions provided to patient in preparation for discharge.   Goal #5   Current Status IN PROGRESS/ONGOING    Goal #6     Goal #6  Current Status         Therapeutic Activity: 15 minutes  Therapeutic Exercise: 8 minutes

## 2024-09-13 VITALS
DIASTOLIC BLOOD PRESSURE: 76 MMHG | BODY MASS INDEX: 37.04 KG/M2 | OXYGEN SATURATION: 98 % | WEIGHT: 236 LBS | RESPIRATION RATE: 18 BRPM | HEIGHT: 67 IN | TEMPERATURE: 98 F | SYSTOLIC BLOOD PRESSURE: 114 MMHG | HEART RATE: 73 BPM

## 2024-09-13 PROBLEM — M16.12 PRIMARY OSTEOARTHRITIS OF LEFT HIP: Status: RESOLVED | Noted: 2024-09-09 | Resolved: 2024-09-13

## 2024-09-13 PROBLEM — Z01.818 PREOP TESTING: Status: RESOLVED | Noted: 2024-09-09 | Resolved: 2024-09-13

## 2024-09-13 PROBLEM — M10.9 GOUT: Status: RESOLVED | Noted: 2024-09-09 | Resolved: 2024-09-13

## 2024-09-13 LAB
ANION GAP SERPL CALC-SCNC: 3 MMOL/L (ref 0–18)
BASOPHILS # BLD AUTO: 0 X10(3) UL (ref 0–0.2)
BASOPHILS NFR BLD AUTO: 0 %
BUN BLD-MCNC: 15 MG/DL (ref 9–23)
BUN/CREAT SERPL: 17.6 (ref 10–20)
CALCIUM BLD-MCNC: 8.7 MG/DL (ref 8.7–10.4)
CHLORIDE SERPL-SCNC: 106 MMOL/L (ref 98–112)
CO2 SERPL-SCNC: 32 MMOL/L (ref 21–32)
CREAT BLD-MCNC: 0.85 MG/DL
DEPRECATED RDW RBC AUTO: 44.3 FL (ref 35.1–46.3)
EGFRCR SERPLBLD CKD-EPI 2021: 75 ML/MIN/1.73M2 (ref 60–?)
EOSINOPHIL # BLD AUTO: 0.18 X10(3) UL (ref 0–0.7)
EOSINOPHIL NFR BLD AUTO: 3.9 %
ERYTHROCYTE [DISTWIDTH] IN BLOOD BY AUTOMATED COUNT: 13.4 % (ref 11–15)
GLUCOSE BLD-MCNC: 114 MG/DL (ref 70–99)
GLUCOSE BLDC GLUCOMTR-MCNC: 101 MG/DL (ref 70–99)
GLUCOSE BLDC GLUCOMTR-MCNC: 131 MG/DL (ref 70–99)
HCT VFR BLD AUTO: 25.3 %
HGB BLD-MCNC: 8.5 G/DL
IMM GRANULOCYTES # BLD AUTO: 0.03 X10(3) UL (ref 0–1)
IMM GRANULOCYTES NFR BLD: 0.7 %
LYMPHOCYTES # BLD AUTO: 0.89 X10(3) UL (ref 1–4)
LYMPHOCYTES NFR BLD AUTO: 19.3 %
MAGNESIUM SERPL-MCNC: 2.2 MG/DL (ref 1.6–2.6)
MCH RBC QN AUTO: 30 PG (ref 26–34)
MCHC RBC AUTO-ENTMCNC: 33.6 G/DL (ref 31–37)
MCV RBC AUTO: 89.4 FL
MONOCYTES # BLD AUTO: 0.38 X10(3) UL (ref 0.1–1)
MONOCYTES NFR BLD AUTO: 8.2 %
NEUTROPHILS # BLD AUTO: 3.13 X10 (3) UL (ref 1.5–7.7)
NEUTROPHILS # BLD AUTO: 3.13 X10(3) UL (ref 1.5–7.7)
NEUTROPHILS NFR BLD AUTO: 67.9 %
OSMOLALITY SERPL CALC.SUM OF ELEC: 294 MOSM/KG (ref 275–295)
PLATELET # BLD AUTO: 121 10(3)UL (ref 150–450)
POTASSIUM SERPL-SCNC: 4.1 MMOL/L (ref 3.5–5.1)
RBC # BLD AUTO: 2.83 X10(6)UL
SODIUM SERPL-SCNC: 141 MMOL/L (ref 136–145)
WBC # BLD AUTO: 4.6 X10(3) UL (ref 4–11)

## 2024-09-13 PROCEDURE — 99239 HOSP IP/OBS DSCHRG MGMT >30: CPT | Performed by: INTERNAL MEDICINE

## 2024-09-13 RX ORDER — DOXYCYCLINE 100 MG/1
100 CAPSULE ORAL EVERY 12 HOURS SCHEDULED
Status: SHIPPED | COMMUNITY
Start: 2024-09-13

## 2024-09-13 RX ORDER — OXYCODONE HYDROCHLORIDE 5 MG/1
5 TABLET ORAL EVERY 4 HOURS PRN
Qty: 30 TABLET | Refills: 0 | Status: SHIPPED | OUTPATIENT
Start: 2024-09-13

## 2024-09-13 RX ORDER — ASPIRIN 81 MG/1
81 TABLET ORAL 2 TIMES DAILY
Status: SHIPPED | COMMUNITY
Start: 2024-09-13

## 2024-09-13 NOTE — OCCUPATIONAL THERAPY NOTE
OCCUPATIONAL THERAPY TREATMENT NOTE - INPATIENT    Room Number: 416/416-A     Presenting Problem: LT FREDY     Problem List  Active Problems:    Essential hypertension    DANN (obstructive sleep apnea)      OCCUPATIONAL THERAPY ASSESSMENT   Patient demonstrates fair progress this session, goals remain in progress.    Patient is requiring up to max assist for ADLs as a result of the following impairments: decreased functional strength, decreased functional reach, decreased endurance, pain, impaired balance, decreased muscular endurance, and difficulty maintaining precautions.    Patient continues to function below baseline with  ADLs and fx mobility/transfers .   Next session anticipate patient to progress transfers, static standing balance, and functional standing tolerance. Occupational Therapy will continue to follow patient for duration of hospitalization.    Patient continues to benefit from continued skilled OT services: to promote return to prior level of function and safety with continuous assistance and gradual rehabilitative therapy.     PLAN  OT Treatment Plan: ADL training;Functional transfer training;Patient/Family education;Patient/Family training;Equipment eval/education;Compensatory technique education  OT Device Recommendations: TBD    SUBJECTIVE  Patient agreeable to OT treatment session.    OBJECTIVE  Precautions: FREDY - posterior;Hip abduction pillow;Drain(s)    WEIGHT BEARING RESTRICTION  L Lower Extremity: Weight Bearing as Tolerated    PAIN ASSESSMENT  Rating: -- (not rated)  Location: L hip  Management Techniques: Body mechanics; Activity promotion; Repositioning    ACTIVITIES OF DAILY LIVING ASSESSMENT  AM-PAC ‘6-Clicks’ Inpatient Daily Activity Short Form  How much help from another person does the patient currently need…  -   Putting on and taking off regular lower body clothing?: A Lot  -   Bathing (including washing, rinsing, drying)?: A Lot  -   Toileting, which includes using toilet, bedpan  or urinal? : A Lot  -   Putting on and taking off regular upper body clothing?: A Little  -   Taking care of personal grooming such as brushing teeth?: A Little  -   Eating meals?: None    AM-PAC Score:  Score: 16  Approx Degree of Impairment: 53.32%  Standardized Score (AM-PAC Scale): 35.96  CMS Modifier (G-Code): CK    BED MOBILITY  Supine to Sit: Mod assist x2     FUNCTIONAL TRANSFER ASSESSMENT  Sit to Stand from EOB: Min assist w/ RW d/t elevated bed height  Chair Transfer: Mod assist for controlled descent  Comments:  Second person present for safety.    FUNCTIONAL MOBILITY  Steps from EOB to Chair using RW: Mod assist w/ RW  Comments:  Second person present for safety.    FUNCTIONAL ADL ASSESSMENT  LB Dressing: max assist  Toileting: max assist  Comments:  Patient able to recall 2/3 posterior hip precautions (except no internal rotation).    EDUCATION PROVIDED  Patient: Role of Occupational Therapy; Plan of Care; Discharge Recommendations; Functional Transfer Techniques; Fall Prevention; Weight Bear Status; Surgical Precautions; Posture/Positioning; Energy Conservation; Compensatory ADL Techniques; Proper Body Mechanics  Patient's Response to Education: Verbalized Understanding; Returned Demonstration    The patient's Approx Degree of Impairment: 53.32% has been calculated based on documentation in the [facility] '6 clicks' Inpatient Daily Activity Short Form.  Research supports that patients with this level of impairment may benefit from rehab.  Final disposition will be made by interdisciplinary medical team.    Patient End of Session: Up in chair;Needs met;Call light within reach;RN aware of session/findings;All patient questions and concerns addressed    OT Goals:    Patient self-stated goal is: indicates goal is to improve mobility     Patient will complete LE dressing with Min A using LHAE  Comment: ongoing    Patient will complete toilet transfer with Min A  Comment: ongoing    Patient will independently  recall posterior hip precautions  Comment: ongoing, 2/3         Goals  on:24  Frequency: 3-5x/week    OT Session Time  Therapeutic Activity: 30 minutes    COLIN Francis/L  [facility]  #[id number]

## 2024-09-13 NOTE — PLAN OF CARE
Problem: Patient Centered Care  Goal: Patient preferences are identified and integrated in the patient's plan of care  Description: Interventions:  - What would you like us to know as we care for you?   - Provide timely, complete, and accurate information to patient/family  - Incorporate patient and family knowledge, values, beliefs, and cultural backgrounds into the planning and delivery of care  - Encourage patient/family to participate in care and decision-making at the level they choose  - Honor patient and family perspectives and choices  Outcome: Adequate for Discharge     Problem: Patient/Family Goals  Goal: Patient/Family Long Term Goal  Description: Patient's Long Term Goal: to be able to walk with out pain    Interventions:  - surgery, physical therapy  - See additional Care Plan goals for specific interventions  Outcome: Adequate for Discharge  Goal: Patient/Family Short Term Goal  Description: Patient's Short Term Goal: to be able to go to rehab    Interventions:   - case management  - See additional Care Plan goals for specific interventions  Outcome: Adequate for Discharge     Problem: PAIN - ADULT  Goal: Verbalizes/displays adequate comfort level or patient's stated pain goal  Description: INTERVENTIONS:  - Encourage pt to monitor pain and request assistance  - Assess pain using appropriate pain scale  - Administer analgesics based on type and severity of pain and evaluate response  - Implement non-pharmacological measures as appropriate and evaluate response  - Consider cultural and social influences on pain and pain management  - Manage/alleviate anxiety  - Utilize distraction and/or relaxation techniques  - Monitor for opioid side effects  - Notify MD/LIP if interventions unsuccessful or patient reports new pain  - Anticipate increased pain with activity and pre-medicate as appropriate  Outcome: Adequate for Discharge     Problem: RISK FOR INFECTION - ADULT  Goal: Absence of fever/infection  during anticipated neutropenic period  Description: INTERVENTIONS  - Monitor WBC  - Administer growth factors as ordered  - Implement neutropenic guidelines  Outcome: Adequate for Discharge     Problem: SAFETY ADULT - FALL  Goal: Free from fall injury  Description: INTERVENTIONS:  - Assess pt frequently for physical needs  - Identify cognitive and physical deficits and behaviors that affect risk of falls.  - [place] fall precautions as indicated by assessment.  - Educate pt/family on patient safety including physical limitations  - Instruct pt to call for assistance with activity based on assessment  - Modify environment to reduce risk of injury  - Provide assistive devices as appropriate  - Consider OT/PT consult to assist with strengthening/mobility  - Encourage toileting schedule  Outcome: Adequate for Discharge     Problem: DISCHARGE PLANNING  Goal: Discharge to home or other facility with appropriate resources  Description: INTERVENTIONS:  - Identify barriers to discharge w/pt and caregiver  - Include patient/family/discharge partner in discharge planning  - Arrange for needed discharge resources and transportation as appropriate  - Identify discharge learning needs (meds, wound care, etc)  - Arrange for interpreters to assist at discharge as needed  - Consider post-discharge preferences of patient/family/discharge partner  - Complete POLST form as appropriate  - Assess patient's ability to be responsible for managing their own health  - Refer to Case Management Department for coordinating discharge planning if the patient needs post-hospital services based on physician/LIP order or complex needs related to functional status, cognitive ability or social support system  Outcome: Adequate for Discharge   Sent to [facility]-report given to KODY More

## 2024-09-13 NOTE — PHYSICAL THERAPY NOTE
PHYSICAL THERAPY TREATMENT NOTE - INPATIENT     Room Number: 416/416-A       Presenting Problem: S/P L FREDY 9/9/24  Co-Morbidities : gout, lumbar fusion, MARGA TKA    Problem List  Active Problems:    Essential hypertension    DANN (obstructive sleep apnea)      PHYSICAL THERAPY ASSESSMENT   Patient demonstrates fair progress this session, goals  remain in progress.      Patient is requiring moderate assist and maximum assist as a result of the following impairments: decreased functional strength, decreased endurance/aerobic capacity, pain, impaired standing balance, decreased muscular endurance, medical status, and limited LLE ROM.     Patient continues to function below baseline with bed mobility, transfers, gait, stair negotiation, standing prolonged periods, and performing household tasks.  Next session anticipate patient to progress bed mobility, transfers, gait, and standing prolonged periods.  Physical Therapy will continue to follow patient for duration of hospitalization.    Patient continues to benefit from continued skilled PT services: to promote return to prior level of function and safety with continuous assistance and gradual rehabilitative therapy .    PLAN  PT Treatment Plan: Bed mobility;Body mechanics;Endurance;Energy conservation;Patient education;Family education;Gait training;Strengthening;Transfer training;Balance training  Frequency (Obs): Daily    SUBJECTIVE  \"I think I would do better with my crutches\"    OBJECTIVE  Precautions: FREDY - posterior;Drain(s);Hip abduction pillow    WEIGHT BEARING RESTRICTION  L Lower Extremity: Weight Bearing as Tolerated    PAIN ASSESSMENT   Rating: Unable to rate  Location: left hip  Management Techniques: Activity promotion;Body mechanics;Repositioning    BALANCE  Static Sitting: Fair  Dynamic Sitting: Fair -  Static Standing: Poor +  Dynamic Standing: Poor    AM-PAC '6-Clicks' INPATIENT SHORT FORM - BASIC MOBILITY  How much difficulty does the patient currently  have...  Patient Difficulty: Turning over in bed (including adjusting bedclothes, sheets and blankets)?: A Lot   Patient Difficulty: Sitting down on and standing up from a chair with arms (e.g., wheelchair, bedside commode, etc.): A Lot   Patient Difficulty: Moving from lying on back to sitting on the side of the bed?: A Lot   How much help from another person does the patient currently need...   Help from Another: Moving to and from a bed to a chair (including a wheelchair)?: A Lot   Help from Another: Need to walk in hospital room?: Total   Help from Another: Climbing 3-5 steps with a railing?: Total     AM-PAC Score:  Raw Score: 10   Approx Degree of Impairment: 76.75%   Standardized Score (AM-PAC Scale): 32.29   CMS Modifier (G-Code): CL    FUNCTIONAL ABILITY STATUS  Functional Mobility/Gait Assessment  Gait Assistance: Not tested  Supine to Sit: moderate assist x 2. Patient tolerated static sitting EOB approx 10 minutes with BUE support and SBA.  Sit to Stand: minimal assist from EOB with height of bed elevated. Cues provided for appropriate UE and LLE placement in order to maintain compliance with posterior hip precautions. Patient tolerated static standing approx 2 minutes with BUE Support on RW and Min A.  SPT EOB>bedside chair: moderate assist with 2nd person CGA for safety. Assistance provided for RW management. Cues provided for appropriate step sequencing. Patient with kyphotic/flexed posture throughout.    Skilled Therapy Provided: Patient in bed upon arrival. RN approved activity. Educated patient on POC and benefits of mobilization. Agreeable to participate. Patient reporting left hip pain, not quantified out of 10 per the pain scale. Re-enforced education on post-op posterior hip precautions. Patient benefits from increased time, cues, reassurance, and rest breaks in order to complete functional mobility tasks.    The patient's Approx Degree of Impairment: 76.75% has been calculated based on  documentation in the [facility] '6 clicks' Inpatient Daily Activity Short Form.  Research supports that patients with this level of impairment may benefit from LTAC, however, patient is functioning below baseline levels and would benefit from rehab.  Final disposition will be made by interdisciplinary medical team.    THERAPEUTIC EXERCISES  Lower Extremity Ankle pumps  LAQ  Quad sets     Position Sitting       Patient End of Session: Up in chair;Needs met;Call light within reach;RN aware of session/findings;All patient questions and concerns addressed    CURRENT GOALS   Goals to be met by: 10/1/24  Patient Goal Patient's self-stated goal is: decrease pain   Goal #1 Patient is able to demonstrate supine - sit EOB @ level: minimum assistance      Goal #1   Current Status Mod A x 2   Goal #2 Patient is able to demonstrate transfers EOB to/from BSC at assistance level: contact guard assistance with walker - rolling      Goal #2  Current Status Mod A with RW   Goal #3 Patient is able to ambulate 20 feet with assist device: walker - rolling at assistance level: minimum assistance   Goal #3   Current Status NT   Goal #4 Patient is able to demonstrate transfers sit to/from stand at assistance level: contact guard assistance with rolling walker      Goal #4   Current Status Min A with RW   Goal #5 Patient to demonstrate independence with home activity/exercise instructions provided to patient in preparation for discharge.   Goal #5   Current Status Ongoing     Therapeutic Activity: 30 minutes

## 2024-09-13 NOTE — DISCHARGE SUMMARY
[facility]  part of [facility]    Discharge Summary    Maria Elena Moore Patient Status:  Inpatient    1955 MRN [id number]   Location [facility] 4W/SW/SE Attending Santos Lee MD   Hosp Day # 4 PCP No primary care provider on file.     Date of Admission: 2024 Disposition: Final discharge disposition not confirmed     Date of Discharge: 2024    Admitting Diagnosis: Left Hip Osteoarthritis  Preop testing    Hospital Discharge Diagnoses:   Left hip OA      Lace+ Score: 18  59-90 High Risk  29-58 Medium Risk  0-28   Low Risk.    TCM Follow-Up Recommendation:  LACE < 29: Low Risk of readmission after discharge from the hospital. No TCM follow-up needed.      Problem List:   Patient Active Problem List   Diagnosis    Essential hypertension    DANN (obstructive sleep apnea)       Reason for Admission: elective hip     Physical Exam:   General appearance: alert, appears stated age and cooperative  Pulmonary:  clear to auscultation bilaterally  Cardiovascular: S1, S2 normal, no murmur, click, rub or gallop, regular rate and rhythm  Abdominal: soft, non-tender; bowel sounds normal; no masses,  no organomegaly  Extremities: extremities normal, atraumatic, no cyanosis or edema  Psychiatric: calm      History of Present Illness: The above referenced H&P was reviewed by Reji Carmona MD on 2024, the patient was examined and no significant changes have occurred in the patient's condition since the H&P was performed.  I discussed with the patient and/or legal representative the potential benefits, risks and side effects of this procedure; the likelihood of the patient achieving goals; and potential problems that might occur during recuperation.  I discussed reasonable alternatives to the procedure, including risks, benefits and side effects related to the alternatives and risks related to not receiving this procedure.  We will proceed with procedure as planned.     [facility]  Course: Primary left hip oa  - s/p left total hip arthroplasty  - pain control with orals as needed, IV for breakthrough  - Asa for dvt prophy  - cont pt/ot - will need FARIBA     Acute expected post op blood loss anemia  - transfuse if hgb less than 7, will monitor     HTN  - monitor vitals and adjust meds prn     BL le lymphedema, chronic  - cont home meds     KIEL  - kiel protocol     Gout  - cont home meds     Chronic back pain   - cont cyclobenzaprine as needed                   Consultations: ortho    Procedures: left FREDY    Complications: none    Discharge Condition: Good    Discharge Medications:      Discharge Medications        START taking these medications        Instructions Prescription details   aspirin 81 MG Tbec      Take 1 tablet (81 mg total) by mouth in the morning and 1 tablet (81 mg total) before bedtime.   Refills: 0     doxycycline 100 MG Caps  Commonly known as: Vibramycin      Take 1 capsule (100 mg total) by mouth every 12 (twelve) hours.   Refills: 0     oxyCODONE 5 MG Tabs      Take 1 tablet (5 mg total) by mouth every 4 (four) hours as needed for Pain.   Quantity: 30 tablet  Refills: 0            CHANGE how you take these medications        Instructions Prescription details   Multi-Vitamin/Minerals Tabs  What changed: Another medication with the same name was removed. Continue taking this medication, and follow the directions you see here.      Take 1 tablet by mouth daily as needed.   Refills: 0            CONTINUE taking these medications        Instructions Prescription details   allopurinol 300 MG Tabs  Commonly known as: Zyloprim      Take 1 tablet (300 mg total) by mouth twice a week. On Tuesday and thurday   Refills: 0     bumetanide 0.5 MG Tabs  Commonly known as: Bumex      Take 1 tablet (0.5 mg total) by mouth BID (Diuretic).   Refills: 0     Calcium Carbonate 600 MG Tabs      Take 1 tablet (600 mg total) by mouth twice a week. On Tuesday/Thursday   Refills: 0     cholecalciferol  50 MCG (2000 UT) Tabs  Commonly known as: Vitamin D3      Take 1 tablet (2,000 Units total) by mouth every morning.   Refills: 0     docusate sodium 100 MG Caps  Commonly known as: Colace      Take 1 capsule (100 mg total) by mouth daily.   Refills: 0     famotidine 40 MG Tabs  Commonly known as: Pepcid      Take 1 tablet (40 mg total) by mouth every morning.   Refills: 0     fluticasone propionate 50 MCG/ACT Susp  Commonly known as: Flonase      by Each Nare route daily.   Refills: 0     Magnesium Gluconate 500 (27 Mg) MG Tabs      Take by mouth After dinner.   Refills: 0     metOLazone 2.5 MG Tabs  Commonly known as: Zaroxolyn      Take 1 tablet (2.5 mg total) by mouth daily. On Monday and Friday   Refills: 0     Mirabegron ER 25 MG Tb24  Commonly known as: MYRBETRIQ      Take 1 tablet (25 mg total) by mouth every morning.   Refills: 0     Mounjaro 2.5 MG/0.5ML Sopn  Generic drug: Tirzepatide      Inject into the skin daily. She take it on Wednesday   Refills: 0     orphenadrine  MG Tb12  Commonly known as: Norflex ER      Take by mouth daily.   Refills: 0     tolterodine 2 MG Tabs  Commonly known as: Detrol      Take 1 tablet (2 mg total) by mouth 2 (two) times daily. Take lunch and supper   Refills: 0     valsartan 160 MG Tabs  Commonly known as: Diovan      Take 1 tablet (160 mg total) by mouth every morning.   Refills: 0     Vitamin C 500 MG Tabs  Commonly known as: VITAMIN C      Take 1 tablet (500 mg total) by mouth every morning.   Refills: 0            STOP taking these medications      Co Q-10 200 MG Caps        ETODOLAC OR        HYDROcodone-acetaminophen 7.5-325 MG Tabs  Commonly known as: Norco        potassium chloride 10 MEQ Tbcr  Commonly known as: Klor-Con M10                  Where to Get Your Medications        Please  your prescriptions at the location directed by your doctor or nurse    Bring a paper prescription for each of these medications  oxyCODONE 5 MG Tabs         Follow up  Visits: Follow-up with  in 1 week    Follow up Labs:      Other Discharge Instructions:     Santos Lee MD  9/13/2024  12:47 PM    > 35 min

## 2024-09-13 NOTE — CM/SW NOTE
09/13/24 1236   Discharge disposition   Expected discharge disposition subacute   Post Acute Care Provider [facility]   Discharge transportation [facility]     The patient received A MDO for discharge.    The patient will be transported to [facility] via [facility] at 3:30pm.  PCS complete.    The number to call report is [phone].  The [facility] liaison is aware of transport time.    Social work informed patient at bedside.    SW/CM to remain available for support and/or discharge planning.     Altagracia Nunez MSW, LSW  Discharge Planner [id number]

## 2024-09-16 NOTE — PAYOR COMM NOTE
--------------  DISCHARGE REVIEW    Payor: [employer] PPO  Subscriber #:  [id number]  Authorization Number: [id number]    Admit date: 24  Admit time:  11:06 AM  Discharge Date: 2024  4:53 PM     Admitting Physician: Behery, Omar Atef, MD  Attending Physician:  No att. providers found  Primary Care Physician: No primary care provider on file.          Discharge Summary Notes        Discharge Summary signed by Santos Lee MD at 2024  6:26 PM       Author: Santos Lee MD Specialty: HOSPITALIST Author Type: Physician    Filed: 2024  6:26 PM Date of Service: 2024 12:47 PM Status: Signed    : Santos Lee MD (Physician)         [facility]  part of [facility]    Discharge Summary    Maria Elena Moore Patient Status:  Inpatient    1955 MRN [id number]   Location [facility] 4W/SW/SE Attending Santos Lee MD   Hosp Day # 4 PCP No primary care provider on file.     Date of Admission: 2024 Disposition: Final discharge disposition not confirmed     Date of Discharge: 2024    Admitting Diagnosis: Left Hip Osteoarthritis  Preop testing    Hospital Discharge Diagnoses:   Left hip OA      Lace+ Score: 18  59-90 High Risk  29-58 Medium Risk  0-28   Low Risk.    TCM Follow-Up Recommendation:  LACE < 29: Low Risk of readmission after discharge from the hospital. No TCM follow-up needed.      Problem List:   Patient Active Problem List   Diagnosis    Essential hypertension    DANN (obstructive sleep apnea)       Reason for Admission: elective hip     Physical Exam:   General appearance: alert, appears stated age and cooperative  Pulmonary:  clear to auscultation bilaterally  Cardiovascular: S1, S2 normal, no murmur, click, rub or gallop, regular rate and rhythm  Abdominal: soft, non-tender; bowel sounds normal; no masses,  no organomegaly  Extremities: extremities normal, atraumatic, no cyanosis or edema  Psychiatric:  calm      History of Present Illness: The above referenced H&P was reviewed by Reji Carmona MD on 9/9/2024, the patient was examined and no significant changes have occurred in the patient's condition since the H&P was performed.  I discussed with the patient and/or legal representative the potential benefits, risks and side effects of this procedure; the likelihood of the patient achieving goals; and potential problems that might occur during recuperation.  I discussed reasonable alternatives to the procedure, including risks, benefits and side effects related to the alternatives and risks related to not receiving this procedure.  We will proceed with procedure as planned.     Hospital Course: Primary left hip oa  - s/p left total hip arthroplasty  - pain control with orals as needed, IV for breakthrough  - Asa for dvt prophy  - cont pt/ot - will need FARIBA     Acute expected post op blood loss anemia  - transfuse if hgb less than 7, will monitor     HTN  - monitor vitals and adjust meds prn     BL le lymphedema, chronic  - cont home meds     DANN  - dann protocol     Gout  - cont home meds     Chronic back pain   - cont cyclobenzaprine as needed                   Consultations: ortho    Procedures: left FREDY    Complications: none    Discharge Condition: Good    Discharge Medications:      Discharge Medications        START taking these medications        Instructions Prescription details   aspirin 81 MG Tbec      Take 1 tablet (81 mg total) by mouth in the morning and 1 tablet (81 mg total) before bedtime.   Refills: 0     doxycycline 100 MG Caps  Commonly known as: Vibramycin      Take 1 capsule (100 mg total) by mouth every 12 (twelve) hours.   Refills: 0     oxyCODONE 5 MG Tabs      Take 1 tablet (5 mg total) by mouth every 4 (four) hours as needed for Pain.   Quantity: 30 tablet  Refills: 0            CHANGE how you take these medications        Instructions Prescription details   Multi-Vitamin/Minerals  Tabs  What changed: Another medication with the same name was removed. Continue taking this medication, and follow the directions you see here.      Take 1 tablet by mouth daily as needed.   Refills: 0            CONTINUE taking these medications        Instructions Prescription details   allopurinol 300 MG Tabs  Commonly known as: Zyloprim      Take 1 tablet (300 mg total) by mouth twice a week. On Tuesday and thurday   Refills: 0     bumetanide 0.5 MG Tabs  Commonly known as: Bumex      Take 1 tablet (0.5 mg total) by mouth BID (Diuretic).   Refills: 0     Calcium Carbonate 600 MG Tabs      Take 1 tablet (600 mg total) by mouth twice a week. On Tuesday/Thursday   Refills: 0     cholecalciferol 50 MCG (2000 UT) Tabs  Commonly known as: Vitamin D3      Take 1 tablet (2,000 Units total) by mouth every morning.   Refills: 0     docusate sodium 100 MG Caps  Commonly known as: Colace      Take 1 capsule (100 mg total) by mouth daily.   Refills: 0     famotidine 40 MG Tabs  Commonly known as: Pepcid      Take 1 tablet (40 mg total) by mouth every morning.   Refills: 0     fluticasone propionate 50 MCG/ACT Susp  Commonly known as: Flonase      by Each Nare route daily.   Refills: 0     Magnesium Gluconate 500 (27 Mg) MG Tabs      Take by mouth After dinner.   Refills: 0     metOLazone 2.5 MG Tabs  Commonly known as: Zaroxolyn      Take 1 tablet (2.5 mg total) by mouth daily. On Monday and Friday   Refills: 0     Mirabegron ER 25 MG Tb24  Commonly known as: MYRBETRIQ      Take 1 tablet (25 mg total) by mouth every morning.   Refills: 0     Mounjaro 2.5 MG/0.5ML Sopn  Generic drug: Tirzepatide      Inject into the skin daily. She take it on Wednesday   Refills: 0     orphenadrine  MG Tb12  Commonly known as: Norflex ER      Take by mouth daily.   Refills: 0     tolterodine 2 MG Tabs  Commonly known as: Detrol      Take 1 tablet (2 mg total) by mouth 2 (two) times daily. Take lunch and supper   Refills: 0     valsartan  160 MG Tabs  Commonly known as: Diovan      Take 1 tablet (160 mg total) by mouth every morning.   Refills: 0     Vitamin C 500 MG Tabs  Commonly known as: VITAMIN C      Take 1 tablet (500 mg total) by mouth every morning.   Refills: 0            STOP taking these medications      Co Q-10 200 MG Caps        ETODOLAC OR        HYDROcodone-acetaminophen 7.5-325 MG Tabs  Commonly known as: Norco        potassium chloride 10 MEQ Tbcr  Commonly known as: Klor-Con M10                  Where to Get Your Medications        Please  your prescriptions at the location directed by your doctor or nurse    Bring a paper prescription for each of these medications  oxyCODONE 5 MG Tabs         Follow up Visits: Follow-up with  in 1 week    Follow up Labs:      Other Discharge Instructions:     Santos Lee MD  9/13/2024  12:47 PM    > 35 min     Electronically signed by Santos Lee MD on 9/13/2024  6:26 PM         REVIEWER COMMENTS

## (undated) DEVICE — SUT ETHLN 2-0 18IN FS NABSRB BLK 26MM 3/8 CIR

## (undated) DEVICE — 1010 S-DRAPE TOWEL DRAPE 10/BX: Brand: STERI-DRAPE™

## (undated) DEVICE — 7.5MM ACORN

## (undated) DEVICE — PENCIL ES BTTN SWCH W/ TIP HOLSTER E-Z CLN

## (undated) DEVICE — SUT COAT VCRL 2-0 27IN ABSRB UD 26MM CT-2

## (undated) DEVICE — SPONGE LAP 18X18IN WHT COT 4 PLY FLD STRUNG

## (undated) DEVICE — APPLICATOR PREP 26ML CHG 2% ISO ALC 70%

## (undated) DEVICE — SKIN PREP TRAY 4 COMPARTM TRAY: Brand: MEDLINE INDUSTRIES, INC.

## (undated) DEVICE — HOOD: Brand: FLYTE

## (undated) DEVICE — SOLUTION IRRIG 3000ML 0.9% NACL FLX CONT

## (undated) DEVICE — SYRINGE MED 20ML STD CLR PLAS LL TIP N CTRL

## (undated) DEVICE — SUT VCRL 1-0 36IN CTX ABSRB UD L48MM 1/2 CIR

## (undated) DEVICE — SUT COAT VCRL+ 1 18IN CTX ABSRB VLT ANTIBACT

## (undated) DEVICE — GAMMEX® PI HYBRID SIZE 8, STERILE POWDER-FREE SURGICAL GLOVE, POLYISOPRENE AND NEOPRENE BLEND: Brand: GAMMEX

## (undated) DEVICE — SUT ETHBND XL 2 30IN V-37 NABSRB GRN 40MM 1/2

## (undated) DEVICE — BIT DRL 3.2X30MM HIP DISP FOR 2

## (undated) DEVICE — ELECTRODE ES L16.5CM BLDE MPLR OPN APPRCH EZ

## (undated) DEVICE — SOLUTION PREP 26ML 0.7% POVACRYLEX 74% ISO

## (undated) DEVICE — COVER XR CASS W21XL40IN UNIV ADH MICROSHIELD

## (undated) DEVICE — HEWSON SUTURE RETRIEVER: Brand: HEWSON SUTURE RETRIEVER

## (undated) DEVICE — IMPERVIOUS STOCKINETTE: Brand: DEROYAL

## (undated) DEVICE — DRAPE,U/ SHT,SPLIT,PLAS,STERIL: Brand: MEDLINE

## (undated) DEVICE — PACK CDS TOTAL HIP

## (undated) DEVICE — SHEET,DRAPE,70X100,STERILE: Brand: MEDLINE

## (undated) DEVICE — SUT MCRYL 3-0 27IN ABSRB UD L24MM PS-1

## (undated) DEVICE — NEEDLE SPNL 18GA L3.5IN W/ QNCKE SHARPER BVL

## (undated) DEVICE — 3M™ COBAN™ NL STERILE NON-LATEX SELF-ADHERENT WRAP, 2084S, 4 IN X 5 YD (10 CM X 4,5 M), 18 ROLLS/CASE: Brand: 3M™ COBAN™

## (undated) DEVICE — 40409 ABDUCTION PILLOW MEDIUM: Brand: 40409 ABDUCTION PILLOW MEDIUM

## (undated) DEVICE — HOOD, PEEL-AWAY: Brand: FLYTE

## (undated) DEVICE — PACK,UNIVERSAL,SPLIT,II: Brand: MEDLINE

## (undated) DEVICE — 3M™ IOBAN™ 2 ANTIMICROBIAL INCISE DRAPE 6650EZ: Brand: IOBAN™ 2

## (undated) DEVICE — Device: Brand: STABLECUT®

## (undated) DEVICE — HANDPIECE SET WITH HIGH FLOW TIP AND SUCTION TUBE: Brand: INTERPULSE

## (undated) DEVICE — GAMMEX® NON-LATEX PI ORTHO SIZE 8.5, STERILE POLYISOPRENE POWDER-FREE SURGICAL GLOVE: Brand: GAMMEX

## (undated) DEVICE — 2T11 #2 PDO 36 X 36: Brand: 2T11 #2 PDO 36 X 36

## (undated) DEVICE — ADHESIVE SKIN TOP FOR WND CLSR DERMBND ADV

## (undated) DEVICE — INTENDED USE FOR SURGICAL MARKING ON INTACT SKIN, ALSO PROVIDES A PERMANENT METHOD OF IDENTIFYING OBJECTS IN THE OPERATING ROOM: Brand: WRITESITE® PLUS MINI PREP RESISTANT MARKER

## (undated) DEVICE — PREVENA PLUS PEEL & PLACE SYSTEM KIT- 35 CM: Brand: PREVENA  PLUS™ PEEL & PLACE™

## (undated) DEVICE — DRESSING SUR 9X25CM SIL SP CVR WTRPRF VIR

## (undated) DEVICE — SUT COAT VCRL 0 27IN CP-1 ABSRB UD 36MM 1/2